# Patient Record
Sex: MALE | Race: WHITE | NOT HISPANIC OR LATINO | Employment: FULL TIME | URBAN - METROPOLITAN AREA
[De-identification: names, ages, dates, MRNs, and addresses within clinical notes are randomized per-mention and may not be internally consistent; named-entity substitution may affect disease eponyms.]

---

## 2017-01-13 ENCOUNTER — ALLSCRIPTS OFFICE VISIT (OUTPATIENT)
Dept: OTHER | Facility: OTHER | Age: 47
End: 2017-01-13

## 2017-01-24 ENCOUNTER — ALLSCRIPTS OFFICE VISIT (OUTPATIENT)
Dept: OTHER | Facility: OTHER | Age: 47
End: 2017-01-24

## 2017-10-09 ENCOUNTER — GENERIC CONVERSION - ENCOUNTER (OUTPATIENT)
Dept: OTHER | Facility: OTHER | Age: 47
End: 2017-10-09

## 2017-10-20 ENCOUNTER — GENERIC CONVERSION - ENCOUNTER (OUTPATIENT)
Dept: OTHER | Facility: OTHER | Age: 47
End: 2017-10-20

## 2017-10-20 LAB
A/G RATIO (HISTORICAL): 2 (ref 1.2–2.2)
ALBUMIN SERPL BCP-MCNC: 4.7 G/DL (ref 3.5–5.5)
ALP SERPL-CCNC: 52 IU/L (ref 39–117)
ALT SERPL W P-5'-P-CCNC: 14 IU/L (ref 0–44)
AST SERPL W P-5'-P-CCNC: 18 IU/L (ref 0–40)
BILIRUB SERPL-MCNC: 0.8 MG/DL (ref 0–1.2)
BUN SERPL-MCNC: 22 MG/DL (ref 6–24)
BUN/CREA RATIO (HISTORICAL): 19 (ref 9–20)
CALCIUM SERPL-MCNC: 9.4 MG/DL (ref 8.7–10.2)
CHLORIDE SERPL-SCNC: 101 MMOL/L (ref 96–106)
CHOLEST SERPL-MCNC: 177 MG/DL (ref 100–199)
CO2 SERPL-SCNC: 24 MMOL/L (ref 18–29)
CREAT SERPL-MCNC: 1.16 MG/DL (ref 0.76–1.27)
EGFR AFRICAN AMERICAN (HISTORICAL): 87 ML/MIN/1.73
EGFR-AMERICAN CALC (HISTORICAL): 75 ML/MIN/1.73
GLUCOSE SERPL-MCNC: 78 MG/DL (ref 65–99)
HDLC SERPL-MCNC: 42 MG/DL
INTERPRETATION (HISTORICAL): NORMAL
LDLC SERPL CALC-MCNC: 120 MG/DL (ref 0–99)
POTASSIUM SERPL-SCNC: 4.4 MMOL/L (ref 3.5–5.2)
PROSTATE SPECIFIC ANTIGEN (HISTORICAL): 0.6 NG/ML (ref 0–4)
SODIUM SERPL-SCNC: 141 MMOL/L (ref 134–144)
TOT. GLOBULIN, SERUM (HISTORICAL): 2.3 G/DL (ref 1.5–4.5)
TOTAL PROTEIN (HISTORICAL): 7 G/DL (ref 6–8.5)
TRIGL SERPL-MCNC: 74 MG/DL (ref 0–149)

## 2017-10-22 ENCOUNTER — GENERIC CONVERSION - ENCOUNTER (OUTPATIENT)
Dept: OTHER | Facility: OTHER | Age: 47
End: 2017-10-22

## 2017-11-06 ENCOUNTER — ALLSCRIPTS OFFICE VISIT (OUTPATIENT)
Dept: OTHER | Facility: OTHER | Age: 47
End: 2017-11-06

## 2018-01-10 NOTE — PROGRESS NOTES
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)   4  10 year risk of MI or stroke < 7 5% (V49 89) (Z91 89)    Discussion/Summary  The patient was counseled regarding risk factor reductions, impressions  Chief Complaint  pt present for CPe, no forms  af/rma      History of Present Illness  HM, Adult Male: The patient is being seen for a health maintenance evaluation  General Health: The patient's health since the last visit is described as good  Lifestyle:  He consumes a diverse and healthy diet  He does not have any weight concerns  He exercises regularly  He does not use tobacco    Screening:      Review of Systems    Cardiovascular: no chest pain  Respiratory: no shortness of breath  Genitourinary: urologist in past for prostatitis, had pelvic floor PT with some benefit  Musculoskeletal: no arthralgias  Active Problems    1  Actinic keratosis (702 0) (L57 0)   2  Allergic rhinitis (477 9) (J30 9)   3  BMI 27 0-27 9,adult (V85 23) (Z68 27)   4  Central serous retinopathy, 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)   5  Colon cancer screening (V76 51) (Z12 11)   6  Depression screening (V79 0) (Z13 89)   7  External hemorrhoids (455 3) (K64 4)   8  Immunization due (V05 9) (Z23)   9  Migraine headache (346 90) (G43 909)   10  Colon's neuroma of right foot (355 6) (G57 61)   11  Overweight (278 02) (E66 3)   12  Prostate cancer screening (V76 44) (Z12 5)   13  Routine eye exam (V72 0) (Z01 00)   14  Screening for cardiovascular, respiratory, and genitourinary diseases    (V81 2,V81 4,V81 6) (Z13 6,Z13 83,Z13 89)   15  Screening for diabetes mellitus (DM) (V77 1) (Z13 1)   16   Well adult on routine health check (V70 0) (Z00 00)    Past Medical History    · History of Blister of right foot, initial encounter (917 2) (F74 490Z)   · History of Bunion (727 1) (M21 619)   · History of Closed Fracture Of The Metatarsal Bone(S) (825 25)   · History of Dyspepsia (536 8) (K30)   · History of acute sinusitis (V12 69) (Z87 09)   · History of chalazion (V12 49) (Z86 69)   · History of dermatitis (V13 3) (Z87 2)   · History of herpes zoster (V12 09) (Z86 19)   · History of tinea corporis (V12 09) (Z86 19)   · History of urticaria (V13 3) (Z87 2)   · History of Palpitations (785 1) (R00 2)   · History of Pes planus, congenital (754 61) (Q66 50)   · History of Plantar fibromatosis (728 71) (M72 2)   · History of Pruritus ani (698 0) (L29 0)   · History of Right foot pain (729 5) (M79 671)   · History of Skin neoplasm (239 2) (D49 2)   · History of Verruca plana (078 19) (B07 8)   · Well adult on routine health check (V70 0) (Z00 00)    Family History  Mother    · Family history of Diabetes mellitus   · Family history of Gout   · Family history of Hypertension  Father    · Family history of Lung cancer    Social History    · Never A Smoker    Current Meds   1  Multi-Vitamin TABS; Therapy: (Recorded:97Gnd2029) to Recorded    Allergies    1  Cefadroxil CAPS   2  Bactrim TABS   3  Cortizone-5   4  Omnicef CAPS   5  Penicillins    Vitals   Recorded: 49YRR0063 01:10PM   Temperature 98 F   Heart Rate 68   Respiration 16   Systolic 195   Diastolic 76   Height 5 ft 7 in   Weight 177 lb    BMI Calculated 27 72   BSA Calculated 1 92     Physical Exam    Constitutional   General appearance: No acute distress, well appearing and well nourished  Eyes   Conjunctiva and lids: No erythema, swelling or discharge  Pupils and irises: Equal, round, reactive to light  Ears, Nose, Mouth, and Throat   External inspection of ears and nose: Normal     Otoscopic examination: Tympanic membranes translucent with normal light reflex  Canals patent without erythema  Nasal mucosa, septum, and turbinates: Normal without edema or erythema  Lips, teeth, and gums: Normal, good dentition      Oropharynx: Normal with no erythema, edema, exudate or lesions  Neck   Neck: Supple, symmetric, trachea midline, no masses  Pulmonary   Respiratory effort: No increased work of breathing or signs of respiratory distress  Auscultation of lungs: Clear to auscultation  Cardiovascular   Palpation of heart: Normal PMI, no thrills  Auscultation of heart: Normal rate and rhythm, normal S1 and S2, no murmurs  Femoral pulses: 2+ bilaterally  Pedal pulses: 2+ bilaterally  Examination of extremities for edema and/or varicosities: Normal     Abdomen   Abdomen: Non-tender, no masses  Liver and spleen: No hepatomegaly or splenomegaly  Examination for hernias: No hernias appreciated  Genitourinary   Scrotal contents: Normal testes, no masses  Penis: Normal, no lesions  Digital rectal exam of prostate: Normal size, no masses  Lymphatic   Palpation of lymph nodes in neck: No lymphadenopathy  Palpation of lymph nodes in groin: No lymphadenopathy  Musculoskeletal   Gait and station: Normal     Inspection/palpation of digits and nails: Normal without clubbing or cyanosis  Inspection/palpation of joints, bones, and muscles: Normal     Skin   Skin and subcutaneous tissue: Normal without rashes or lesions  Palpation of skin and subcutaneous tissue: Normal turgor  Neurologic   Reflexes: 2+ and symmetric  Sensation: No sensory loss  Psychiatric   Judgment and insight: Normal     Mood and affect: Normal        Results/Data  [place] Risk for General CVD [id number] 01:20PM Jewel Rice     Test Name Result Flag Reference   [place] CVD - Heart Age 53 Years     [place] CVD - Normal 6 %     [place] CVD - Ten Year 6 5 %     Sex: Male  Age: 56  Total Cholesterol: 177 mg/dL  HDL Cholesterol: 42 mg/dL  Systolic Blood Pressure: 213 mmHg  Diabetes: No  Smoking Status: No  Being treated for hypertension: No       Procedure    Procedure: Visual Acuity Test    Inforrmation supplied by a Snellen chart     Results: 20/15 in both eyes without corrective device, 20/15 in the right eye without corrective device, 20/20 in the left eye without corrective device      Signatures   Electronically signed by : Deidra Estrada DO; Nov 6 2017  1:34PM EST                       (Author)

## 2018-01-12 VITALS
SYSTOLIC BLOOD PRESSURE: 128 MMHG | DIASTOLIC BLOOD PRESSURE: 76 MMHG | HEART RATE: 68 BPM | HEIGHT: 67 IN | RESPIRATION RATE: 16 BRPM | BODY MASS INDEX: 27.78 KG/M2 | WEIGHT: 177 LBS | TEMPERATURE: 98 F

## 2018-01-13 VITALS
SYSTOLIC BLOOD PRESSURE: 120 MMHG | DIASTOLIC BLOOD PRESSURE: 70 MMHG | WEIGHT: 178 LBS | HEIGHT: 67 IN | BODY MASS INDEX: 27.94 KG/M2

## 2018-01-15 VITALS
BODY MASS INDEX: 27.94 KG/M2 | WEIGHT: 178 LBS | DIASTOLIC BLOOD PRESSURE: 88 MMHG | SYSTOLIC BLOOD PRESSURE: 130 MMHG | HEIGHT: 67 IN

## 2018-01-15 NOTE — PROGRESS NOTES
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)   4  Overweight (278 02) (E66 3)    Plan  Health Maintenance    · Avoid sun exposure ; Status:Complete;   Done: 44MMN2983   · Begin a limited exercise program ; Status:Complete;   Done: 38JCP8930   · Drink plenty of fluids ; Status:Complete;   Done: 89TYK8450   · Eat a low fat and low cholesterol diet ; Status:Complete;   Done: 59GAF9933   · Eat foods that are high in calcium ; Status:Complete;   Done: 72HWK7129   · Shared Decision Making Aid given; Status:Complete;   Done: 47ITP5898    Chief Complaint  pt present for a CPE  ac student cma      History of Present Illness  HM, Adult Male: The patient is being seen for a health maintenance evaluation  General Health: The patient's health since the last visit is described as good  Immunizations status: up to date  Lifestyle:  He does not have a healthy diet  He exercises regularly  He does not use tobacco  He denies alcohol use  likes to run  Screening:   HPI: gets headaches after running but not as much when he drinks adequate water, has been taking electrolyte supplement also, labs normal      Review of Systems    Constitutional: no fever and no chills  Cardiovascular: no chest pain  Respiratory: no shortness of breath  Neurological: no dizziness and no fainting  Active Problems    1  Actinic keratosis (702 0) (L57 0)   2  Allergic rhinitis (477 9) (J30 9)   3  BMI 27 0-27 9,adult (V85 23) (Z68 27)   4  Central serous retinopathy, 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)   5  Colon cancer screening (V76 51) (Z12 11)   6  Depression screening (V79 0) (Z13 89)   7  External Hemorrhoids (455 3)   8  Immunization due (V05 9) (Z23)   9  Migraine headache (346 90) (G43 909)   10  Overweight (278 02) (E66 3)   11  Pes planus, congenital (754 61) (Q66 50)   12   Prostate cancer screening (V76 44) (Z12 5)   13  Screening for diabetes mellitus (DM) (V77 1) (Z13 1)   14  Screening for lipid disorders (V77 91) (Z13 220)   15  Well adult on routine health check (V70 0) (Z00 00)    Past Medical History    · History of Bunion (727 1) (M20 10)   · History of Closed Fracture Of The Metatarsal Bone(S) (825 25)   · History of Dyspepsia (536 8) (K30)   · History of acute sinusitis (V12 69) (Z87 09)   · History of chalazion (V12 49) (Z86 69)   · History of dermatitis (V13 3) (Z87 2)   · History of herpes zoster (V12 09) (Z86 19)   · History of tinea corporis (V12 09) (Z86 19)   · History of urticaria (V13 3) (Z87 2)   · History of Palpitations (785 1) (R00 2)   · History of Plantar fibromatosis (728 71) (M72 2)   · History of Pruritus ani (698 0) (L29 0)   · History of Skin neoplasm (239 2) (D49 2)   · Well adult on routine health check (V70 0) (Z00 00)    Family History    · Family history of Diabetes mellitus   · Family history of Gout   · Family history of Hypertension    · Family history of Lung cancer    Social History    · Never A Smoker    Current Meds   1  Multi-Vitamin TABS; Therapy: (Recorded:64Rmo1891) to Recorded    Allergies    1  Cefadroxil CAPS   2  Bactrim TABS   3  Cortizone-5   4  Omnicef CAPS   5  Penicillins    Vitals   Recorded: 58Vtj6893 03:26PM   Temperature 98 3 F   Heart Rate 90   Respiration 20   Systolic 784   Diastolic 70   Height 5 ft 7 in   Weight 178 lb    BMI Calculated 27 88   BSA Calculated 1 92     Physical Exam    Constitutional   General appearance: No acute distress, well appearing and well nourished  Eyes   Conjunctiva and lids: No erythema, swelling or discharge  Pupils and irises: Equal, round, reactive to light  Ophthalmoscopic examination: Normal fundi and optic discs  Ears, Nose, Mouth, and Throat   External inspection of ears and nose: Normal     Otoscopic examination: Tympanic membranes translucent with normal light reflex  Canals patent without erythema  Nasal mucosa, septum, and turbinates: Normal without edema or erythema  Lips, teeth, and gums: Normal, good dentition  Oropharynx: Normal with no erythema, edema, exudate or lesions  Neck   Neck: Supple, symmetric, trachea midline, no masses  Thyroid: Normal, no thyromegaly  Pulmonary   Respiratory effort: No increased work of breathing or signs of respiratory distress  Auscultation of lungs: Clear to auscultation  Cardiovascular   Auscultation of heart: Normal rate and rhythm, normal S1 and S2, no murmurs  Carotid pulses: 2+ bilaterally  Abdominal aorta: Normal     Examination of extremities for edema and/or varicosities: Normal     Chest   Breasts: Normal, no dimpling or skin changes appreciated  Abdomen   Abdomen: Non-tender, no masses  Liver and spleen: No hepatomegaly or splenomegaly  Examination for hernias: No hernias appreciated  Anus, perineum, and rectum: Normal sphincter tone, no masses, no prolapse  Genitourinary   Scrotal contents: Normal testes, no masses  Penis: Normal, no lesions  Digital rectal exam of prostate: Normal size, no masses  Lymphatic   Palpation of lymph nodes in neck: No lymphadenopathy  Palpation of lymph nodes in groin: No lymphadenopathy  Musculoskeletal   Gait and station: Normal     Inspection/palpation of digits and nails: Normal without clubbing or cyanosis  Inspection/palpation of joints, bones, and muscles: Normal     Range of motion: Normal     Stability: Normal     Skin   Skin and subcutaneous tissue: Normal without rashes or lesions  Palpation of skin and subcutaneous tissue: Normal turgor  Neurologic   Reflexes: 2+ and symmetric  Sensation: No sensory loss      Psychiatric   Judgment and insight: Normal     Mood and affect: Normal        Results/Data  DIGITAL RECTAL EXAM 20Jan2015 08:05PM Elex Mooring     Test Name Result Flag Reference   DIGITAL RECTAL EXAM [id number]         Procedure    Procedure: Visual Acuity Test    Indication: routine screening     Results: 20/15 in both eyes without corrective device, 20/15 in the right eye without corrective device, 20/20 in the left eye without corrective device   Color vision was and the results were normal       Signatures   Electronically signed by : Jennifer Treadwell DO; Feb 26 2016 10:33PM EST                       (Author)

## 2018-01-15 NOTE — RESULT NOTES
Verified Results  (1) COMPREHENSIVE METABOLIC PANEL [id number] 64:20EW Luis Mckeon     Test Name Result Flag Reference   Glucose, Serum 78 mg/dL  65-99   BUN 22 mg/dL  6-24   Creatinine, Serum 1 16 mg/dL  0 76-1 27   BUN/Creatinine Ratio 19  9-20   Sodium, Serum 141 mmol/L  134-144   Potassium, Serum 4 4 mmol/L  3 5-5 2   Chloride, Serum 101 mmol/L     Carbon Dioxide, Total 24 mmol/L  18-29   Calcium, Serum 9 4 mg/dL  8 7-10 2   Protein, Total, Serum 7 0 g/dL  6 0-8 5   Albumin, Serum 4 7 g/dL  3 5-5 5   Globulin, Total 2 3 g/dL  1 5-4 5   A/G Ratio 2 0  1 2-2 2   Bilirubin, Total 0 8 mg/dL  0 0-1 2   Alkaline Phosphatase, S 52 IU/L     AST (SGOT) 18 IU/L  0-40   ALT (SGPT) 14 IU/L  0-44   eGFR If NonAfricn Am 75 mL/min/1 73  >59   eGFR If Africn Am 87 mL/min/1 73  >59

## 2018-01-18 NOTE — RESULT NOTES
Verified Results  (1) PSA (SCREEN) (Dx V76 44 Screen for Prostate Cancer) [id number] 09:37AM Maurilio Dodson     Test Name Result Flag Reference   Prostate Specific Ag, Serum 0 6 ng/mL  0 0-4 0   Roche ECLIA methodology  According to the American Urological Association, Serum PSA should  decrease and remain at undetectable levels after radical  prostatectomy  The AUA defines biochemical recurrence as an initial  PSA value 0 2 ng/mL or greater followed by a subsequent confirmatory  PSA value 0 2 ng/mL or greater  Values obtained with different assay methods or kits cannot be used  interchangeably  Results cannot be interpreted as absolute evidence  of the presence or absence of malignant disease

## 2018-11-20 VITALS
HEART RATE: 71 BPM | SYSTOLIC BLOOD PRESSURE: 121 MMHG | BODY MASS INDEX: 27.62 KG/M2 | DIASTOLIC BLOOD PRESSURE: 84 MMHG | WEIGHT: 176 LBS | HEIGHT: 67 IN

## 2018-11-20 DIAGNOSIS — M54.50 ACUTE MIDLINE LOW BACK PAIN WITHOUT SCIATICA: Primary | ICD-10-CM

## 2018-11-20 PROCEDURE — 99203 OFFICE O/P NEW LOW 30 MIN: CPT | Performed by: ORTHOPAEDIC SURGERY

## 2018-11-20 RX ORDER — MULTIVITAMIN
TABLET ORAL
COMMUNITY

## 2018-11-20 NOTE — PROGRESS NOTES
Assessment/Plan:  1  Acute midline low back pain without sciatica         Mary Rose has low back discomfort and the sensation of popping is back which does not sound concerning  He is likely just recreating an audible pop at the facet joint which is what chiropractors use to improve range of motion  I informed him that he should refrain from continuously doing this but this does provide some relief with his discomfort at time and it is certainly okay  We could have him start physical therapy for to improve his hamstring flexibility  He does not have any concerning signs of radiculopathy on exam today  If he has continued discomfort we could consider x-ray imaging if needed  He can follow up with me as needed  Subjective: Mery Archibald is a 53 y o  male who presents for evaluation for low back discomfort  He denies any specific injury or trauma  He states over the last few months he has noticed increased necessity to crack his back  He denies falling on his back but does state that when he runs several days awake he feels a lot of tightness in his low back and is hamstring  He can twist his back in a maneuver and get an audible pop which improved his range of motion and eliminates the discomfort  He saw a chiropractor who given adjustment which did help him  He denies any radicular pain or numbness in his legs whatsoever  Review of Systems   Constitutional: Negative for chills, fever and unexpected weight change  HENT: Negative for hearing loss, nosebleeds and sore throat  Eyes: Negative for pain, redness and visual disturbance  Respiratory: Negative for cough, shortness of breath and wheezing  Cardiovascular: Negative for chest pain, palpitations and leg swelling  Gastrointestinal: Negative for abdominal pain, nausea and vomiting  Endocrine: Negative for polyphagia and polyuria  Genitourinary: Negative for dysuria and hematuria     Musculoskeletal:        See HPI   Skin: Negative for rash and wound  Neurological: Negative for dizziness, numbness and headaches  Psychiatric/Behavioral: Negative for decreased concentration and suicidal ideas  The patient is not nervous/anxious  History reviewed  No pertinent past medical history  History reviewed  No pertinent surgical history  History reviewed  No pertinent family history  Social History     Occupational History    Not on file  Social History Main Topics    Smoking status: Never Smoker    Smokeless tobacco: Never Used    Alcohol use Yes      Comment: social    Drug use: No    Sexual activity: Not on file         Current Outpatient Prescriptions:     Multiple Vitamin (MULTI VITAMIN DAILY) TABS, Take by mouth, Disp: , Rfl:     Allergies   Allergen Reactions    Cefadroxil Hives    Cefdinir     Cortizone-10  [Hydrocortisone]     Penicillins      Reaction Date: [id number];     Sulfamethoxazole-Trimethoprim Rash       Objective:  Vitals:    11/20/18 1408   BP: 121/84   Pulse: 71       Back Exam     Tenderness   The patient is experiencing no tenderness  Range of Motion   The patient has normal back ROM  Muscle Strength   The patient has normal back strength  Tests   Straight leg raise right: negative  Straight leg raise left: negative    Reflexes   Patellar: normal    Other   Toe Walk: normal  Heel Walk: normal  Sensation: normal  Gait: normal   Erythema: no back redness            Physical Exam   Constitutional: He is oriented to person, place, and time  He appears well-developed  HENT:   Head: Normocephalic and atraumatic  Eyes: Conjunctivae are normal    Neck: Neck supple  Cardiovascular: Intact distal pulses  Pulmonary/Chest: Effort normal    Neurological: He is alert and oriented to person, place, and time  Skin: Skin is warm and dry  Psychiatric: He has a normal mood and affect  His behavior is normal    Vitals reviewed

## 2019-05-24 ENCOUNTER — TELEPHONE (OUTPATIENT)
Dept: FAMILY MEDICINE CLINIC | Facility: CLINIC | Age: 49
End: 2019-05-24

## 2019-05-24 ENCOUNTER — OFFICE VISIT (OUTPATIENT)
Dept: PODIATRY | Facility: CLINIC | Age: 49
End: 2019-05-24
Payer: COMMERCIAL

## 2019-05-24 VITALS
DIASTOLIC BLOOD PRESSURE: 84 MMHG | WEIGHT: 176 LBS | SYSTOLIC BLOOD PRESSURE: 125 MMHG | HEIGHT: 67 IN | BODY MASS INDEX: 27.62 KG/M2

## 2019-05-24 DIAGNOSIS — Q66.51 CONGENITAL PES PLANUS OF RIGHT FOOT: ICD-10-CM

## 2019-05-24 DIAGNOSIS — Q66.52 CONGENITAL PES PLANUS OF LEFT FOOT: ICD-10-CM

## 2019-05-24 DIAGNOSIS — M72.2 PLANTAR FASCIITIS: Primary | ICD-10-CM

## 2019-05-24 DIAGNOSIS — G57.61 MORTON'S NEUROMA OF SECOND INTERSPACE OF RIGHT FOOT: ICD-10-CM

## 2019-05-24 PROCEDURE — L3000 FT INSERT UCB BERKELEY SHELL: HCPCS | Performed by: PODIATRIST

## 2019-05-24 PROCEDURE — 99213 OFFICE O/P EST LOW 20 MIN: CPT | Performed by: PODIATRIST

## 2019-05-27 DIAGNOSIS — Z13.89 SCREENING FOR CARDIOVASCULAR, RESPIRATORY, AND GENITOURINARY DISEASES: Primary | ICD-10-CM

## 2019-05-27 DIAGNOSIS — Z13.83 SCREENING FOR CARDIOVASCULAR, RESPIRATORY, AND GENITOURINARY DISEASES: Primary | ICD-10-CM

## 2019-05-27 DIAGNOSIS — Z13.1 SCREENING FOR DIABETES MELLITUS (DM): ICD-10-CM

## 2019-05-27 DIAGNOSIS — Z13.6 SCREENING FOR CARDIOVASCULAR, RESPIRATORY, AND GENITOURINARY DISEASES: Primary | ICD-10-CM

## 2019-05-27 DIAGNOSIS — Z12.5 PROSTATE CANCER SCREENING: ICD-10-CM

## 2019-06-02 PROBLEM — K64.4 EXTERNAL HEMORRHOIDS: Status: ACTIVE | Noted: 2017-10-09

## 2019-06-02 PROBLEM — G57.61 MORTON'S NEUROMA OF RIGHT FOOT: Status: ACTIVE | Noted: 2017-01-13

## 2019-06-04 LAB
ALBUMIN SERPL-MCNC: 4.7 G/DL (ref 3.5–5.5)
ALBUMIN/GLOB SERPL: 2.1 {RATIO} (ref 1.2–2.2)
ALP SERPL-CCNC: 53 IU/L (ref 39–117)
ALT SERPL-CCNC: 14 IU/L (ref 0–44)
AST SERPL-CCNC: 19 IU/L (ref 0–40)
BILIRUB SERPL-MCNC: 0.7 MG/DL (ref 0–1.2)
BUN SERPL-MCNC: 16 MG/DL (ref 6–24)
BUN/CREAT SERPL: 15 (ref 9–20)
CALCIUM SERPL-MCNC: 9.9 MG/DL (ref 8.7–10.2)
CHLORIDE SERPL-SCNC: 99 MMOL/L (ref 96–106)
CHOLEST SERPL-MCNC: 179 MG/DL (ref 100–199)
CO2 SERPL-SCNC: 27 MMOL/L (ref 20–29)
CREAT SERPL-MCNC: 1.08 MG/DL (ref 0.76–1.27)
GLOBULIN SER-MCNC: 2.2 G/DL (ref 1.5–4.5)
GLUCOSE SERPL-MCNC: 88 MG/DL (ref 65–99)
HDLC SERPL-MCNC: 43 MG/DL
LABCORP COMMENT: NORMAL
LDLC SERPL CALC-MCNC: 119 MG/DL (ref 0–99)
MICRODELETION SYND BLD/T FISH: NORMAL
POTASSIUM SERPL-SCNC: 4.1 MMOL/L (ref 3.5–5.2)
PROT SERPL-MCNC: 6.9 G/DL (ref 6–8.5)
PSA SERPL-MCNC: 0.6 NG/ML (ref 0–4)
SL AMB EGFR AFRICAN AMERICAN: 93 ML/MIN/1.73
SL AMB EGFR NON AFRICAN AMERICAN: 81 ML/MIN/1.73
SODIUM SERPL-SCNC: 140 MMOL/L (ref 134–144)
TRIGL SERPL-MCNC: 86 MG/DL (ref 0–149)

## 2019-06-07 ENCOUNTER — OFFICE VISIT (OUTPATIENT)
Dept: FAMILY MEDICINE CLINIC | Facility: CLINIC | Age: 49
End: 2019-06-07
Payer: COMMERCIAL

## 2019-06-07 VITALS
TEMPERATURE: 98.5 F | DIASTOLIC BLOOD PRESSURE: 66 MMHG | RESPIRATION RATE: 16 BRPM | WEIGHT: 177.6 LBS | HEART RATE: 64 BPM | BODY MASS INDEX: 27.88 KG/M2 | HEIGHT: 67 IN | SYSTOLIC BLOOD PRESSURE: 110 MMHG

## 2019-06-07 DIAGNOSIS — Z00.00 HEALTHCARE MAINTENANCE: Primary | ICD-10-CM

## 2019-06-07 DIAGNOSIS — Z91.89 FRAMINGHAM CARDIAC RISK <10% IN NEXT 10 YEARS: ICD-10-CM

## 2019-06-07 DIAGNOSIS — H35.712 CENTRAL SEROUS RETINOPATHY, LEFT: ICD-10-CM

## 2019-06-07 DIAGNOSIS — B35.6 TINEA CRURIS: ICD-10-CM

## 2019-06-07 PROCEDURE — 99396 PREV VISIT EST AGE 40-64: CPT | Performed by: FAMILY MEDICINE

## 2019-06-07 RX ORDER — NYSTATIN 100000 U/G
CREAM TOPICAL 2 TIMES DAILY
Qty: 30 G | Refills: 5 | Status: SHIPPED | OUTPATIENT
Start: 2019-06-07 | End: 2020-09-09

## 2020-05-12 ENCOUNTER — TELEMEDICINE (OUTPATIENT)
Dept: FAMILY MEDICINE CLINIC | Facility: CLINIC | Age: 50
End: 2020-05-12
Payer: COMMERCIAL

## 2020-05-12 DIAGNOSIS — K29.00 ACUTE GASTRITIS WITHOUT HEMORRHAGE, UNSPECIFIED GASTRITIS TYPE: Primary | ICD-10-CM

## 2020-05-12 DIAGNOSIS — N52.9 ERECTILE DYSFUNCTION, UNSPECIFIED ERECTILE DYSFUNCTION TYPE: ICD-10-CM

## 2020-05-12 DIAGNOSIS — H35.712 CENTRAL SEROUS RETINOPATHY, LEFT: ICD-10-CM

## 2020-05-12 PROCEDURE — 99214 OFFICE O/P EST MOD 30 MIN: CPT | Performed by: FAMILY MEDICINE

## 2020-05-12 RX ORDER — TADALAFIL 20 MG/1
TABLET ORAL
COMMUNITY
Start: 2020-03-20 | End: 2020-05-12

## 2020-05-12 RX ORDER — ONDANSETRON 8 MG/1
8 TABLET, ORALLY DISINTEGRATING ORAL EVERY 8 HOURS PRN
Qty: 30 TABLET | Refills: 0 | Status: SHIPPED | OUTPATIENT
Start: 2020-05-12 | End: 2020-09-09

## 2020-05-12 RX ORDER — SILDENAFIL 100 MG/1
100 TABLET, FILM COATED ORAL DAILY PRN
Qty: 30 TABLET | Refills: 5 | Status: SHIPPED | OUTPATIENT
Start: 2020-05-12 | End: 2021-05-13 | Stop reason: SDUPTHER

## 2020-05-12 RX ORDER — SUCRALFATE ORAL 1 G/10ML
1 SUSPENSION ORAL 4 TIMES DAILY
Qty: 420 ML | Refills: 0 | Status: SHIPPED | OUTPATIENT
Start: 2020-05-12 | End: 2020-09-07

## 2020-08-14 ENCOUNTER — TELEPHONE (OUTPATIENT)
Dept: FAMILY MEDICINE CLINIC | Facility: CLINIC | Age: 50
End: 2020-08-14

## 2020-08-14 DIAGNOSIS — Z13.1 SCREENING FOR DIABETES MELLITUS (DM): ICD-10-CM

## 2020-08-14 DIAGNOSIS — N52.9 ERECTILE DYSFUNCTION, UNSPECIFIED ERECTILE DYSFUNCTION TYPE: ICD-10-CM

## 2020-08-14 DIAGNOSIS — Z13.89 SCREENING FOR CARDIOVASCULAR, RESPIRATORY, AND GENITOURINARY DISEASES: Primary | ICD-10-CM

## 2020-08-14 DIAGNOSIS — Z13.83 SCREENING FOR CARDIOVASCULAR, RESPIRATORY, AND GENITOURINARY DISEASES: Primary | ICD-10-CM

## 2020-08-14 DIAGNOSIS — Z13.6 SCREENING FOR CARDIOVASCULAR, RESPIRATORY, AND GENITOURINARY DISEASES: Primary | ICD-10-CM

## 2020-08-14 DIAGNOSIS — Z12.5 PROSTATE CANCER SCREENING: ICD-10-CM

## 2020-08-14 NOTE — TELEPHONE ENCOUNTER
Patient called scheduled his CPE appt for 9/9/2020 with Dr Romero Koyanagi  He would like to go for his routine blood work prior to his visit  He also asked that Dr Romero Koyanagi add a morning testosterone test with that order  His Urologist Dr Watson Zarco ([facility]), advised him to ask his PCP to order this      Please call patient when order is ready for

## 2020-09-02 LAB
ALBUMIN SERPL-MCNC: 4.8 G/DL (ref 4–5)
ALBUMIN/GLOB SERPL: 2.5 {RATIO} (ref 1.2–2.2)
ALP SERPL-CCNC: 53 IU/L (ref 39–117)
ALT SERPL-CCNC: 16 IU/L (ref 0–44)
AST SERPL-CCNC: 19 IU/L (ref 0–40)
BILIRUB SERPL-MCNC: 0.6 MG/DL (ref 0–1.2)
BUN SERPL-MCNC: 19 MG/DL (ref 6–24)
BUN/CREAT SERPL: 18 (ref 9–20)
CALCIUM SERPL-MCNC: 9.4 MG/DL (ref 8.7–10.2)
CHLORIDE SERPL-SCNC: 103 MMOL/L (ref 96–106)
CHOLEST SERPL-MCNC: 167 MG/DL (ref 100–199)
CO2 SERPL-SCNC: 23 MMOL/L (ref 20–29)
CREAT SERPL-MCNC: 1.08 MG/DL (ref 0.76–1.27)
GLOBULIN SER-MCNC: 1.9 G/DL (ref 1.5–4.5)
GLUCOSE SERPL-MCNC: 85 MG/DL (ref 65–99)
HDLC SERPL-MCNC: 40 MG/DL
LDLC SERPL CALC-MCNC: 108 MG/DL (ref 0–99)
MICRODELETION SYND BLD/T FISH: NORMAL
POTASSIUM SERPL-SCNC: 4.2 MMOL/L (ref 3.5–5.2)
PROT SERPL-MCNC: 6.7 G/DL (ref 6–8.5)
PSA SERPL-MCNC: 0.6 NG/ML (ref 0–4)
SL AMB EGFR AFRICAN AMERICAN: 93 ML/MIN/1.73
SL AMB EGFR NON AFRICAN AMERICAN: 80 ML/MIN/1.73
SODIUM SERPL-SCNC: 140 MMOL/L (ref 134–144)
TESTOST SERPL-MCNC: 810 NG/DL (ref 264–916)
TRIGL SERPL-MCNC: 105 MG/DL (ref 0–149)

## 2020-09-07 PROBLEM — K29.00 ACUTE GASTRITIS WITHOUT HEMORRHAGE: Status: RESOLVED | Noted: 2020-05-12 | Resolved: 2020-09-07

## 2020-09-09 ENCOUNTER — OFFICE VISIT (OUTPATIENT)
Dept: FAMILY MEDICINE CLINIC | Facility: CLINIC | Age: 50
End: 2020-09-09
Payer: COMMERCIAL

## 2020-09-09 VITALS
BODY MASS INDEX: 26.68 KG/M2 | TEMPERATURE: 97.2 F | HEART RATE: 70 BPM | DIASTOLIC BLOOD PRESSURE: 70 MMHG | RESPIRATION RATE: 16 BRPM | SYSTOLIC BLOOD PRESSURE: 118 MMHG | WEIGHT: 170 LBS | HEIGHT: 67 IN | OXYGEN SATURATION: 98 %

## 2020-09-09 DIAGNOSIS — K29.50 CHRONIC GASTRITIS WITHOUT BLEEDING, UNSPECIFIED GASTRITIS TYPE: ICD-10-CM

## 2020-09-09 DIAGNOSIS — Z23 NEED FOR VACCINATION: ICD-10-CM

## 2020-09-09 DIAGNOSIS — Z00.00 HEALTHCARE MAINTENANCE: Primary | ICD-10-CM

## 2020-09-09 DIAGNOSIS — Z23 IMMUNIZATION DUE: ICD-10-CM

## 2020-09-09 PROCEDURE — 90471 IMMUNIZATION ADMIN: CPT

## 2020-09-09 PROCEDURE — 1036F TOBACCO NON-USER: CPT | Performed by: FAMILY MEDICINE

## 2020-09-09 PROCEDURE — 3725F SCREEN DEPRESSION PERFORMED: CPT | Performed by: FAMILY MEDICINE

## 2020-09-09 PROCEDURE — 99396 PREV VISIT EST AGE 40-64: CPT | Performed by: FAMILY MEDICINE

## 2020-09-09 PROCEDURE — 90686 IIV4 VACC NO PRSV 0.5 ML IM: CPT

## 2020-09-09 RX ORDER — CHLORAL HYDRATE 500 MG
1000 CAPSULE ORAL DAILY
COMMUNITY
End: 2021-09-27 | Stop reason: ALTCHOICE

## 2020-09-09 RX ORDER — OMEGA-3S/DHA/EPA/FISH OIL/D3 300MG-1000
400 CAPSULE ORAL DAILY
COMMUNITY

## 2020-09-09 NOTE — PROGRESS NOTES
Assessment/Plan:    No problem-specific Assessment & Plan notes found for this encounter  cpe  Gi f/u with Dr Tato Francois for ongoing gastritis symptoms  Suggest colonoscopy at age 49  Labs reviewed     Diagnoses and all orders for this visit:    Healthcare maintenance    Need for vaccination  -     Cancel: influenza vaccine, quadrivalent, recombinant, PF, 0 5 mL, for patients 18 yr+ (FLUBLOK)    Immunization due  -     influenza vaccine, quadrivalent, 0 5 mL, preservative-free, for adult and pediatric patients 6 mos+ (AFLURIA, FLUARIX, FLULAVAL, FLUZONE)    Chronic gastritis without bleeding, unspecified gastritis type  -     Ambulatory referral to Gastroenterology; Future    Other orders  -     cholecalciferol (VITAMIN D3) 400 units tablet; Take 400 Units by mouth daily  -     Omega-3 Fatty Acids (fish oil) 1,000 mg; Take 1,000 mg by mouth daily        Return if symptoms worsen or fail to improve  Subjective:      Patient ID: Maggie Quintanilla is a 53 y o  male  Chief Complaint   Patient presents with    Physical Exam     jlopezcma        HPI  Hx of   Sees Dr Matthew Wall locally, retinology Dr Aaron Spaulding well  Lost 8# with gastritis issue    carafate helped at the time  Better with time and food  No specific food trigger  No coffee  Minimal etoh  EGD in past, 6y ago, Dr Tato Francois  Some acid issues    Bike and run and lift    viagra caused gi upset  Hx of prostatitis    uro Dr David Lozano for prostatis    The following portions of the patient's history were reviewed and updated as appropriate: allergies, current medications, past family history, past medical history, past social history, past surgical history and problem list     Review of Systems   Respiratory: Negative for shortness of breath  Cardiovascular: Negative for chest pain           Current Outpatient Medications   Medication Sig Dispense Refill    cholecalciferol (VITAMIN D3) 400 units tablet Take 400 Units by mouth daily      Multiple Vitamin (MULTI VITAMIN DAILY) TABS Take by mouth      Omega-3 Fatty Acids (fish oil) 1,000 mg Take 1,000 mg by mouth daily      sildenafil (VIAGRA) 100 mg tablet Take 1 tablet (100 mg total) by mouth daily as needed for erectile dysfunction 30 tablet 5     No current facility-administered medications for this visit  Objective:    /70   Pulse 70   Temp (!) 97 2 °F (36 2 °C)   Resp 16   Ht 5' 7" (1 702 m)   Wt 77 1 kg (170 lb)   SpO2 98%   BMI 26 63 kg/m²        Physical Exam  Vitals signs and nursing note reviewed  Constitutional:       Appearance: Normal appearance  He is well-developed  He is not ill-appearing  HENT:      Head: Normocephalic  Right Ear: Tympanic membrane and external ear normal       Left Ear: Tympanic membrane and external ear normal       Nose: No rhinorrhea  Mouth/Throat:      Pharynx: No posterior oropharyngeal erythema  Eyes:      General: No scleral icterus  Conjunctiva/sclera: Conjunctivae normal    Neck:      Musculoskeletal: Neck supple  No neck rigidity  Thyroid: No thyromegaly  Cardiovascular:      Rate and Rhythm: Normal rate and regular rhythm  Heart sounds: No murmur  Pulmonary:      Effort: Pulmonary effort is normal  No respiratory distress  Breath sounds: No wheezing  Abdominal:      General: Bowel sounds are normal  There is no distension  Palpations: Abdomen is soft  There is no mass  Hernia: No hernia is present  Genitourinary:     Penis: Normal        Scrotum/Testes: Normal       Prostate: Normal       Rectum: Normal    Musculoskeletal:         General: No deformity  Lymphadenopathy:      Cervical: No cervical adenopathy  Skin:     General: Skin is warm and dry  Coloration: Skin is not jaundiced or pale  Neurological:      Mental Status: He is alert  Motor: No weakness        Gait: Gait normal    Psychiatric:         Mood and Affect: Mood normal          Behavior: Behavior normal  Thought Content: Thought content normal          BMI Counseling: Body mass index is 26 63 kg/m²  The BMI is above normal  Nutrition recommendations include decreasing portion sizes and moderation in carbohydrate intake  Exercise recommendations include exercising 3-5 times per week  No pharmacotherapy was ordered                Micki Sosa DO

## 2020-10-13 DIAGNOSIS — Z83.79 FAMILY HISTORY OF CELIAC DISEASE: Primary | ICD-10-CM

## 2020-11-28 LAB
ENDOMYSIUM IGA SER QL: NEGATIVE
GLIADIN PEPTIDE IGA SER-ACNC: 3 UNITS (ref 0–19)
GLIADIN PEPTIDE IGG SER-ACNC: 5 UNITS (ref 0–19)
IGA SERPL-MCNC: 122 MG/DL (ref 90–386)
TTG IGA SER-ACNC: <2 U/ML (ref 0–3)
TTG IGG SER-ACNC: 6 U/ML (ref 0–5)

## 2021-01-14 ENCOUNTER — OFFICE VISIT (OUTPATIENT)
Dept: GASTROENTEROLOGY | Facility: CLINIC | Age: 51
End: 2021-01-14
Payer: COMMERCIAL

## 2021-01-14 VITALS
SYSTOLIC BLOOD PRESSURE: 135 MMHG | DIASTOLIC BLOOD PRESSURE: 89 MMHG | HEART RATE: 66 BPM | HEIGHT: 67 IN | BODY MASS INDEX: 28.09 KG/M2 | WEIGHT: 179 LBS

## 2021-01-14 DIAGNOSIS — R06.6 HICCOUGH: ICD-10-CM

## 2021-01-14 DIAGNOSIS — K21.00 GASTROESOPHAGEAL REFLUX DISEASE WITH ESOPHAGITIS WITHOUT HEMORRHAGE: Primary | ICD-10-CM

## 2021-01-14 PROCEDURE — 1036F TOBACCO NON-USER: CPT | Performed by: INTERNAL MEDICINE

## 2021-01-14 PROCEDURE — 99214 OFFICE O/P EST MOD 30 MIN: CPT | Performed by: INTERNAL MEDICINE

## 2021-01-14 PROCEDURE — 3008F BODY MASS INDEX DOCD: CPT | Performed by: INTERNAL MEDICINE

## 2021-01-14 RX ORDER — PANTOPRAZOLE SODIUM 40 MG/1
40 TABLET, DELAYED RELEASE ORAL DAILY
Qty: 90 TABLET | Refills: 2 | Status: SHIPPED | OUTPATIENT
Start: 2021-01-14 | End: 2022-08-06

## 2021-01-14 RX ORDER — PANTOPRAZOLE SODIUM 40 MG/1
40 TABLET, DELAYED RELEASE ORAL
COMMUNITY
Start: 2020-10-01 | End: 2021-09-27 | Stop reason: SDUPTHER

## 2021-01-14 NOTE — PROGRESS NOTES
[address] [facility] - Outpatient Follow-up Note  David Oh 53 y o  male MRN: [id number]  Encounter: [id number]          ASSESSMENT AND PLAN:      1  Gastroesophageal reflux disease with esophagitis without hemorrhage  -     pantoprazole (PROTONIX) 40 mg tablet; Take 1 tablet (40 mg total) by mouth daily  Patient has significant heartburn, burping and bloating  She is having heartburn and indigestion after each meal   He has cut down spicy food and tomato sauce  He is also consuming rare red wine  He has not had any weight loss or weight gain  He denies any chest pain, cough or wheezing  He will see me again in three months  He will take pantoprazole 40 mg a day  Of consider cutting it down to 20 mg in three months  We had a long discussion regarding taking proton pump inhibitor  Will try to limit this to only short-term basis  2  Hiccough  He cuffing happens mostly after eating something spicy  Patient was prescribed pantoprazole which he has never taken  ______________________________________________________________________    SUBJECTIVE:  See above  Patient has not taken his PPI as prescribed before  I have discussed with him regarding taking this medication on a regular basis 1/2 hour before breakfast   He denies any other significant symptoms at this time  He is active  He does run on a regular basis  REVIEW OF SYSTEMS IS OTHERWISE NEGATIVE        Historical Information   Past Medical History:   Diagnosis Date    Bunion     Resolved 5/3/2015     Closed fracture of base of metatarsal bone     Last assessed 5/8/2006     Herpes zoster     Last assessed 8/21/2006     Pes planus, congenital     Resolved 10/9/2017     Plantar fibromatosis     Resolved 10/9/2017     Skin neoplasm     Resolved 2/21/2016     Verruca plana     Resolved 10/9/2017      Past Surgical History:   Procedure Laterality Date    HERNIA REPAIR      UMBILICAL HERNIA REPAIR       Social History   Social History     Substance and Sexual Activity   Alcohol Use Yes    Comment: social     Social History     Substance and Sexual Activity   Drug Use No     Social History     Tobacco Use   Smoking Status Never Smoker   Smokeless Tobacco Never Used     Family History   Problem Relation Age of Onset    Diabetes Mother     Gout Mother     Hypertension Mother     Lung cancer Father     Ulcerative colitis Brother        Meds/Allergies       Current Outpatient Medications:     cholecalciferol (VITAMIN D3) 400 units tablet    Multiple Vitamin (MULTI VITAMIN DAILY) TABS    Quercetin 250 MG TABS    Omega-3 Fatty Acids (fish oil) 1,000 mg    pantoprazole (PROTONIX) 40 mg tablet    pantoprazole (PROTONIX) 40 mg tablet    sildenafil (VIAGRA) 100 mg tablet    Allergies   Allergen Reactions    Cefadroxil Hives    Cefdinir     Cortizone-10  [Hydrocortisone]     Penicillins      Reaction Date: [id number];     Sulfamethoxazole-Trimethoprim Rash           Objective     Blood pressure 135/89, pulse 66, height 5' 7" (1 702 m), weight 81 2 kg (179 lb)  Body mass index is 28 04 kg/m²  PHYSICAL EXAM:      General Appearance:   Alert, cooperative, no distress   HEENT:   Normocephalic, atraumatic, anicteric  Neck:  Supple, symmetrical, trachea midline   Lungs:   Clear to auscultation bilaterally; no rales, rhonchi or wheezing; respirations unlabored    Heart[de-identified]   Regular rate and rhythm; no murmur  Abdomen:   Soft, non-tender, non-distended; normal bowel sounds; no masses, no organomegaly    Genitalia:   Deferred    Rectal:   Deferred    Extremities:  No cyanosis, clubbing or edema    Skin:  No jaundice, rashes, or lesions    Lymph nodes:  No palpable cervical lymphadenopathy        Lab Results:   No visits with results within 1 Day(s) from this visit     Latest known visit with results is:   Orders Only on 11/24/2020   Component Date Value    Gliadin IgA 11/24/2020 3     Gliadin IgG 11/24/2020 5     TISSUE TRANSGLUTAMINASE * 11/24/2020 <2     Tissue Transglut Ab IGG 11/24/2020 6*    Endomysial IgA 11/24/2020 Negative     IgA 11/24/2020 122          Radiology Results:   No results found

## 2021-04-08 DIAGNOSIS — Z23 ENCOUNTER FOR IMMUNIZATION: ICD-10-CM

## 2021-05-06 ENCOUNTER — OFFICE VISIT (OUTPATIENT)
Dept: GASTROENTEROLOGY | Facility: CLINIC | Age: 51
End: 2021-05-06
Payer: COMMERCIAL

## 2021-05-06 VITALS
HEART RATE: 59 BPM | BODY MASS INDEX: 27.31 KG/M2 | DIASTOLIC BLOOD PRESSURE: 90 MMHG | SYSTOLIC BLOOD PRESSURE: 126 MMHG | HEIGHT: 67 IN | WEIGHT: 174 LBS

## 2021-05-06 DIAGNOSIS — R14.2 FLATULENCE, ERUCTATION AND GAS PAIN: Primary | ICD-10-CM

## 2021-05-06 DIAGNOSIS — Z12.11 ENCOUNTER FOR SCREENING COLONOSCOPY: ICD-10-CM

## 2021-05-06 DIAGNOSIS — N41.1 CHRONIC PROSTATITIS: ICD-10-CM

## 2021-05-06 DIAGNOSIS — R14.1 FLATULENCE, ERUCTATION AND GAS PAIN: Primary | ICD-10-CM

## 2021-05-06 DIAGNOSIS — R10.10 PAIN OF UPPER ABDOMEN: ICD-10-CM

## 2021-05-06 DIAGNOSIS — R14.3 FLATULENCE, ERUCTATION AND GAS PAIN: Primary | ICD-10-CM

## 2021-05-06 PROCEDURE — 3008F BODY MASS INDEX DOCD: CPT | Performed by: INTERNAL MEDICINE

## 2021-05-06 PROCEDURE — 99214 OFFICE O/P EST MOD 30 MIN: CPT | Performed by: INTERNAL MEDICINE

## 2021-05-06 PROCEDURE — 1036F TOBACCO NON-USER: CPT | Performed by: INTERNAL MEDICINE

## 2021-05-06 NOTE — PROGRESS NOTES
[facility] - Outpatient Follow-up Note  Mily Tubbs 53 y o  male MRN: [id number]  Encounter: [id number]          ASSESSMENT AND PLAN:      1  Flatulence, eructation and gas pain  Patient complains of flatulence and burping  He has eructation  He denies any significant heartburn  He is currently on PPI  He denies any chest pain, cough or wheezing  There is no weight loss or weight gain  He has some nausea which has been recurrent  There is no dysphagia or odynophagia  Have discussed regarding reflux symptom  He probably has aerophagia  He does have significant anxiety and worries from work  Anxiety control has been explained  Patient will try to do that  He may need anxiety takes in the future depending on his clinical course  - PAT Covid Screening; Future  - US abdomen complete; Future  - Comprehensive metabolic panel; Future  - CBC and differential; Future    2  Pain of upper abdomen    Complains of pain in the epigastric area  He had  Midline hernia repair  He has mesh placement there  He does experience pain in the epigastric area  This mostly food related  He denies any change in bowel habits or rectal bleeding  A upper abdominal ultrasound will be ordered  Biliary tract disease has to be excluded  - US abdomen complete; Future  - Comprehensive metabolic panel; Future  - CBC and differential; Future    3  Encounter for screening colonoscopy    Patient is turning 51 next month  Screening colonoscopy will be scheduled  - Colonoscopy; Future    4  Chronic prostatitis    He has history of prostatitis  Will order PSA along with CBC and CMP  Patient understands  - PSA Total, Diagnostic; Future    ______________________________________________________________________    SUBJECTIVE:    Epigastric discomfort, burping and bloating  Nausea is present  No vomiting  REVIEW OF SYSTEMS IS OTHERWISE NEGATIVE        Historical Information   Past Medical History:   Diagnosis Date    Bunion     Resolved 5/3/2015     Closed fracture of base of metatarsal bone     Last assessed 5/8/2006     Gastritis     GERD (gastroesophageal reflux disease)     Herpes zoster     Last assessed 8/21/2006     Pes planus, congenital     Resolved 10/9/2017     Plantar fibromatosis     Resolved 10/9/2017     Skin neoplasm     Resolved 2/21/2016     Verruca plana     Resolved 10/9/2017      Past Surgical History:   Procedure Laterality Date    HERNIA REPAIR      UMBILICAL HERNIA REPAIR      UPPER GASTROINTESTINAL ENDOSCOPY       Social History   Social History     Substance and Sexual Activity   Alcohol Use Yes    Comment: social     Social History     Substance and Sexual Activity   Drug Use No     Social History     Tobacco Use   Smoking Status Never Smoker   Smokeless Tobacco Never Used     Family History   Problem Relation Age of Onset    Diabetes Mother     Gout Mother     Hypertension Mother     Lung cancer Father     Ulcerative colitis Brother        Meds/Allergies       Current Outpatient Medications:     pantoprazole (PROTONIX) 40 mg tablet    Quercetin 250 MG TABS    cholecalciferol (VITAMIN D3) 400 units tablet    Multiple Vitamin (MULTI VITAMIN DAILY) TABS    Omega-3 Fatty Acids (fish oil) 1,000 mg    pantoprazole (PROTONIX) 40 mg tablet    sildenafil (VIAGRA) 100 mg tablet    Allergies   Allergen Reactions    Cefadroxil Hives    Cefdinir     Cortizone-10  [Hydrocortisone]     Penicillins      Reaction Date: [id number];     Sulfamethoxazole-Trimethoprim Rash           Objective     Blood pressure 126/90, pulse 59, height 5' 7" (1 702 m), weight 78 9 kg (174 lb)  Body mass index is 27 25 kg/m²        PHYSICAL EXAM:      General Appearance:   Alert, cooperative, no distress   HEENT:   Normocephalic, atraumatic, anicteric      Neck:  Supple, symmetrical, trachea midline   Lungs:   Clear to auscultation bilaterally; no rales, rhonchi or wheezing; respirations unlabored    Heart[de-identified]   Regular rate and rhythm; no murmur, rub, or gallop  Abdomen:   Soft, non-tender, non-distended; normal bowel sounds; no masses, no organomegaly   Midline scar noted   Some tenderness in the midline epigastric area   Liver is not enlarged   Genitalia:   Deferred    Rectal:   Deferred    Extremities:  No cyanosis, clubbing or edema    Pulses:  2+ and symmetric    Skin:  No jaundice, rashes, or lesions    Lymph nodes:  No palpable cervical lymphadenopathy        Lab Results:   No visits with results within 1 Day(s) from this visit  Latest known visit with results is:   Orders Only on 11/24/2020   Component Date Value    Gliadin IgA 11/24/2020 3     Gliadin IgG 11/24/2020 5     TISSUE TRANSGLUTAMINASE * 11/24/2020 <2     Tissue Transglut Ab IGG 11/24/2020 6*    Endomysial IgA 11/24/2020 Negative     IgA 11/24/2020 122          Radiology Results:   No results found

## 2021-05-13 ENCOUNTER — HOSPITAL ENCOUNTER (OUTPATIENT)
Dept: RADIOLOGY | Facility: HOSPITAL | Age: 51
Discharge: HOME | End: 2021-05-13
Attending: INTERNAL MEDICINE

## 2021-05-13 DIAGNOSIS — R14.2 FLATULENCE, ERUCTATION AND GAS PAIN: ICD-10-CM

## 2021-05-13 DIAGNOSIS — N52.9 ERECTILE DYSFUNCTION, UNSPECIFIED ERECTILE DYSFUNCTION TYPE: ICD-10-CM

## 2021-05-13 DIAGNOSIS — R14.3 FLATULENCE, ERUCTATION AND GAS PAIN: ICD-10-CM

## 2021-05-13 DIAGNOSIS — R10.10 PAIN OF UPPER ABDOMEN: ICD-10-CM

## 2021-05-13 DIAGNOSIS — R14.1 FLATULENCE, ERUCTATION AND GAS PAIN: ICD-10-CM

## 2021-05-16 DIAGNOSIS — N52.9 ERECTILE DYSFUNCTION, UNSPECIFIED ERECTILE DYSFUNCTION TYPE: ICD-10-CM

## 2021-05-17 RX ORDER — SILDENAFIL 100 MG/1
TABLET, FILM COATED ORAL
Qty: 30 TABLET | Refills: 5 | OUTPATIENT
Start: 2021-05-17

## 2021-05-17 RX ORDER — SILDENAFIL 100 MG/1
100 TABLET, FILM COATED ORAL DAILY PRN
Qty: 30 TABLET | Refills: 1 | Status: SHIPPED | OUTPATIENT
Start: 2021-05-17 | End: 2021-09-27 | Stop reason: ALTCHOICE

## 2021-05-17 NOTE — TELEPHONE ENCOUNTER
Pt stated he only wanted to come in for his yearly and the rx does not need to be filled  Pt will call when it gets closer to September

## 2021-05-21 ENCOUNTER — HOSPITAL ENCOUNTER (OUTPATIENT)
Dept: RADIOLOGY | Facility: HOSPITAL | Age: 51
Discharge: HOME | End: 2021-05-21
Attending: INTERNAL MEDICINE
Payer: COMMERCIAL

## 2021-05-21 PROCEDURE — 76700 US EXAM ABDOM COMPLETE: CPT

## 2021-07-06 ENCOUNTER — OFFICE VISIT (OUTPATIENT)
Dept: GASTROENTEROLOGY | Facility: CLINIC | Age: 51
End: 2021-07-06
Payer: COMMERCIAL

## 2021-07-06 VITALS
HEART RATE: 64 BPM | SYSTOLIC BLOOD PRESSURE: 130 MMHG | WEIGHT: 173 LBS | DIASTOLIC BLOOD PRESSURE: 89 MMHG | BODY MASS INDEX: 27.15 KG/M2 | HEIGHT: 67 IN

## 2021-07-06 DIAGNOSIS — K21.9 GASTROESOPHAGEAL REFLUX DISEASE WITHOUT ESOPHAGITIS: ICD-10-CM

## 2021-07-06 DIAGNOSIS — Z12.11 ENCOUNTER FOR SCREENING COLONOSCOPY: ICD-10-CM

## 2021-07-06 DIAGNOSIS — K29.50 CHRONIC GASTRITIS WITHOUT BLEEDING, UNSPECIFIED GASTRITIS TYPE: ICD-10-CM

## 2021-07-06 DIAGNOSIS — R10.13 EPIGASTRIC PAIN: Primary | ICD-10-CM

## 2021-07-06 PROCEDURE — 99214 OFFICE O/P EST MOD 30 MIN: CPT | Performed by: INTERNAL MEDICINE

## 2021-07-06 PROCEDURE — 3008F BODY MASS INDEX DOCD: CPT | Performed by: INTERNAL MEDICINE

## 2021-07-06 PROCEDURE — 1036F TOBACCO NON-USER: CPT | Performed by: INTERNAL MEDICINE

## 2021-07-06 NOTE — PROGRESS NOTES
[facility] - Outpatient Follow-up Note  Kate Oh 49 y o  male MRN: [id number]  Encounter: [id number]          ASSESSMENT AND PLAN:      1  Epigastric pain   patient has history of gastroesophageal reflux disease  He also has history of chronic epigastric pain and discomfort  He had hernia surgery in the epigastric area with mesh placement  It is unclear if his pain is mostly from the mesh induced irritation and fibrosis or adhesion this area  Peptic disease unlikely  He does have history of significant gastritis and reflux  There was no ulcer noted  Patient has felt better on Pepto-Bismol which he is taking now  I have advised him to take Pepto-Bismol for about 4-6 weeks  He will take 2 tablets or 2 tsp 3 times a day  Upper endoscopy will be done for evaluation     - EGD; Future    2  Gastroesophageal reflux disease without esophagitis    Patient has epigastric discomfort and heartburn  Discomfort is mostly food related  See above discussion     - EGD; Future    3  Chronic gastritis without bleeding, unspecified gastritis type    See above discussion  4  Encounter for screening colonoscopy    Patient is age 49  Screening colonoscopy will be needed  ______________________________________________________________________    SUBJECTIVE:    Epigastric discomfort and occasional pain  Reflux symptoms  There is no nausea or vomiting  There is no dysphagia or odynophagia  Denies any chest pain, cough or wheezing  Denies any palpitation, orthopnea or exertional dyspnea  He denies any dysuria hematuria  He denies any history of arthritis or degenerative joint disease  REVIEW OF SYSTEMS IS OTHERWISE NEGATIVE        Historical Information   Past Medical History:   Diagnosis Date    Bunion     Resolved 5/3/2015     Closed fracture of base of metatarsal bone     Last assessed 5/8/2006     Gastritis     GERD (gastroesophageal reflux disease)     Herpes zoster Last assessed 8/21/2006     Pes planus, congenital     Resolved 10/9/2017     Plantar fibromatosis     Resolved 10/9/2017     Skin neoplasm     Resolved 2/21/2016     Verruca plana     Resolved 10/9/2017      Past Surgical History:   Procedure Laterality Date    HERNIA REPAIR      UMBILICAL HERNIA REPAIR      UPPER GASTROINTESTINAL ENDOSCOPY       Social History   Social History     Substance and Sexual Activity   Alcohol Use Yes    Comment: social     Social History     Substance and Sexual Activity   Drug Use No     Social History     Tobacco Use   Smoking Status Never Smoker   Smokeless Tobacco Never Used     Family History   Problem Relation Age of Onset    Diabetes Mother     Gout Mother     Hypertension Mother     Lung cancer Father     Ulcerative colitis Brother        Meds/Allergies       Current Outpatient Medications:     cholecalciferol (VITAMIN D3) 400 units tablet    Multiple Vitamin (MULTI VITAMIN DAILY) TABS    pantoprazole (PROTONIX) 40 mg tablet    pantoprazole (PROTONIX) 40 mg tablet    Omega-3 Fatty Acids (fish oil) 1,000 mg    Quercetin 250 MG TABS    sildenafil (VIAGRA) 100 mg tablet    Allergies   Allergen Reactions    Cefadroxil Hives    Cefdinir     Cortizone-10  [Hydrocortisone]     Penicillins      Reaction Date: [id number];     Sulfamethoxazole-Trimethoprim Rash           Objective     Blood pressure 130/89, pulse 64, height 5' 7" (1 702 m), weight 78 5 kg (173 lb)  Body mass index is 27 1 kg/m²  PHYSICAL EXAM:      General Appearance:   Alert, cooperative, no distress   HEENT:   Normocephalic, atraumatic, anicteric      Neck:  Supple, symmetrical, trachea midline   Lungs:   Clear to auscultation bilaterally; no rales, rhonchi or wheezing; respirations unlabored    Heart[de-identified]   Regular rate and rhythm; no murmur, rub, or gallop     Abdomen:   Soft, non-tender, non-distended; normal bowel sounds; no masses, no organomegaly   There is nodularity in the midline in the supraumbilical area   This relates to the area very had hernia surgery  There is no guarding or rigidity   Genitalia:   Deferred    Rectal:   Deferred    Extremities:  No cyanosis, clubbing or edema    Pulses:  2+ and symmetric    Skin:  No jaundice, rashes, or lesions    Lymph nodes:  No palpable cervical lymphadenopathy        Lab Results:   No visits with results within 1 Day(s) from this visit  Latest known visit with results is:   Orders Only on 11/24/2020   Component Date Value    Gliadin IgA 11/24/2020 3     Gliadin IgG 11/24/2020 5     TISSUE TRANSGLUTAMINASE * 11/24/2020 <2     Tissue Transglut Ab IGG 11/24/2020 6*    Endomysial IgA 11/24/2020 Negative     IgA 11/24/2020 122          Radiology Results:   No results found

## 2021-09-27 ENCOUNTER — HOSPITAL ENCOUNTER (OUTPATIENT)
Dept: GASTROENTEROLOGY | Facility: AMBULATORY SURGERY CENTER | Age: 51
Discharge: HOME | End: 2021-09-27
Payer: COMMERCIAL

## 2021-09-27 ENCOUNTER — ANESTHESIA (OUTPATIENT)
Dept: GASTROENTEROLOGY | Facility: AMBULATORY SURGERY CENTER | Age: 51
End: 2021-09-27

## 2021-09-27 ENCOUNTER — ANESTHESIA EVENT (OUTPATIENT)
Dept: GASTROENTEROLOGY | Facility: AMBULATORY SURGERY CENTER | Age: 51
End: 2021-09-27

## 2021-09-27 VITALS
DIASTOLIC BLOOD PRESSURE: 81 MMHG | HEIGHT: 67 IN | TEMPERATURE: 97.8 F | WEIGHT: 165 LBS | HEART RATE: 61 BPM | OXYGEN SATURATION: 98 % | RESPIRATION RATE: 18 BRPM | SYSTOLIC BLOOD PRESSURE: 119 MMHG | BODY MASS INDEX: 25.9 KG/M2

## 2021-09-27 DIAGNOSIS — R10.13 EPIGASTRIC PAIN: ICD-10-CM

## 2021-09-27 DIAGNOSIS — Z12.11 ENCOUNTER FOR SCREENING COLONOSCOPY: ICD-10-CM

## 2021-09-27 DIAGNOSIS — K21.9 GASTROESOPHAGEAL REFLUX DISEASE WITHOUT ESOPHAGITIS: ICD-10-CM

## 2021-09-27 PROBLEM — Z86.0100 PERSONAL HISTORY OF COLONIC POLYPS: Status: ACTIVE | Noted: 2021-09-27

## 2021-09-27 PROBLEM — Z86.010 PERSONAL HISTORY OF COLONIC POLYPS: Status: ACTIVE | Noted: 2021-09-27

## 2021-09-27 PROCEDURE — 00813 ANES UPR LWR GI NDSC PX: CPT | Performed by: NURSE ANESTHETIST, CERTIFIED REGISTERED

## 2021-09-27 PROCEDURE — 43239 EGD BIOPSY SINGLE/MULTIPLE: CPT | Performed by: INTERNAL MEDICINE

## 2021-09-27 PROCEDURE — 45380 COLONOSCOPY AND BIOPSY: CPT | Performed by: INTERNAL MEDICINE

## 2021-09-27 RX ORDER — PROPOFOL 10 MG/ML
INJECTION, EMULSION INTRAVENOUS AS NEEDED
Status: DISCONTINUED | OUTPATIENT
Start: 2021-09-27 | End: 2021-09-27

## 2021-09-27 RX ORDER — SODIUM CHLORIDE 9 MG/ML
125 INJECTION, SOLUTION INTRAVENOUS CONTINUOUS
Status: DISCONTINUED | OUTPATIENT
Start: 2021-09-27 | End: 2021-10-01 | Stop reason: HOSPADM

## 2021-09-27 RX ORDER — SODIUM CHLORIDE 9 MG/ML
20 INJECTION, SOLUTION INTRAVENOUS CONTINUOUS
Status: DISCONTINUED | OUTPATIENT
Start: 2021-09-27 | End: 2021-10-01 | Stop reason: HOSPADM

## 2021-09-27 RX ORDER — SODIUM CHLORIDE 9 MG/ML
INJECTION, SOLUTION INTRAVENOUS CONTINUOUS PRN
Status: DISCONTINUED | OUTPATIENT
Start: 2021-09-27 | End: 2021-09-27

## 2021-09-27 RX ADMIN — PROPOFOL 100 MG: 10 INJECTION, EMULSION INTRAVENOUS at 11:37

## 2021-09-27 RX ADMIN — SODIUM CHLORIDE: 9 INJECTION, SOLUTION INTRAVENOUS at 11:25

## 2021-09-27 RX ADMIN — SODIUM CHLORIDE: 9 INJECTION, SOLUTION INTRAVENOUS at 11:28

## 2021-09-27 RX ADMIN — PROPOFOL 100 MG: 10 INJECTION, EMULSION INTRAVENOUS at 11:31

## 2021-09-27 RX ADMIN — PROPOFOL 100 MG: 10 INJECTION, EMULSION INTRAVENOUS at 11:44

## 2021-09-27 RX ADMIN — PROPOFOL 100 MG: 10 INJECTION, EMULSION INTRAVENOUS at 11:25

## 2021-09-27 RX ADMIN — PROPOFOL 50 MG: 10 INJECTION, EMULSION INTRAVENOUS at 11:42

## 2021-09-27 NOTE — ANESTHESIA PREPROCEDURE EVALUATION
Procedure:  COLONOSCOPY  EGD    Relevant Problems   GI/HEPATIC   (+) Gastroesophageal reflux disease with esophagitis without hemorrhage        Physical Exam    Airway    Mallampati score: II  TM Distance: >3 FB  Neck ROM: full     Dental   No notable dental hx     Cardiovascular  Rhythm: regular, Rate: normal, Cardiovascular exam normal    Pulmonary  Pulmonary exam normal Breath sounds clear to auscultation,     Other Findings        Anesthesia Plan  ASA Score- 2     Anesthesia Type- IV sedation with anesthesia with ASA Monitors  Additional Monitors:   Airway Plan:           Plan Factors-Exercise tolerance (METS): >4 METS  Chart reviewed  EKG reviewed  Imaging results reviewed  Existing labs reviewed  Patient summary reviewed  Patient is not a current smoker  Induction- intravenous  Postoperative Plan-     Informed Consent- Anesthetic plan and risks discussed with patient

## 2021-09-27 NOTE — ANESTHESIA POSTPROCEDURE EVALUATION
Post-Op Assessment Note    CV Status:  Stable  Pain Score: 0    Pain management: adequate     Mental Status:  Alert   Hydration Status:  Stable   PONV Controlled:  None   Airway Patency:  Patent      Post Op Vitals Reviewed: Yes      Staff: CRNA         No complications documented      BP   116/70   Temp      Pulse  54   Resp   18   SpO2   100

## 2021-09-30 ENCOUNTER — TELEPHONE (OUTPATIENT)
Dept: FAMILY MEDICINE CLINIC | Facility: CLINIC | Age: 51
End: 2021-09-30

## 2021-09-30 DIAGNOSIS — Z12.5 PROSTATE CANCER SCREENING: ICD-10-CM

## 2021-09-30 DIAGNOSIS — Z13.1 SCREENING FOR DIABETES MELLITUS (DM): ICD-10-CM

## 2021-09-30 DIAGNOSIS — Z13.89 SCREENING FOR CARDIOVASCULAR, RESPIRATORY, AND GENITOURINARY DISEASES: Primary | ICD-10-CM

## 2021-09-30 DIAGNOSIS — Z13.83 SCREENING FOR CARDIOVASCULAR, RESPIRATORY, AND GENITOURINARY DISEASES: Primary | ICD-10-CM

## 2021-09-30 DIAGNOSIS — Z13.6 SCREENING FOR CARDIOVASCULAR, RESPIRATORY, AND GENITOURINARY DISEASES: Primary | ICD-10-CM

## 2021-10-08 ENCOUNTER — RA CDI HCC (OUTPATIENT)
Dept: OTHER | Facility: HOSPITAL | Age: 51
End: 2021-10-08

## 2021-10-13 LAB
ALBUMIN SERPL-MCNC: 5 G/DL (ref 4–5)
ALBUMIN/GLOB SERPL: 2.3 {RATIO} (ref 1.2–2.2)
ALP SERPL-CCNC: 50 IU/L (ref 44–121)
ALT SERPL-CCNC: 15 IU/L (ref 0–44)
AST SERPL-CCNC: 20 IU/L (ref 0–40)
BILIRUB SERPL-MCNC: 1 MG/DL (ref 0–1.2)
BUN SERPL-MCNC: 17 MG/DL (ref 6–24)
BUN/CREAT SERPL: 14 (ref 9–20)
CALCIUM SERPL-MCNC: 9.8 MG/DL (ref 8.7–10.2)
CHLORIDE SERPL-SCNC: 100 MMOL/L (ref 96–106)
CHOLEST SERPL-MCNC: 181 MG/DL (ref 100–199)
CO2 SERPL-SCNC: 26 MMOL/L (ref 20–29)
CREAT SERPL-MCNC: 1.2 MG/DL (ref 0.76–1.27)
GLOBULIN SER-MCNC: 2.2 G/DL (ref 1.5–4.5)
GLUCOSE SERPL-MCNC: 86 MG/DL (ref 65–99)
HDLC SERPL-MCNC: 47 MG/DL
LDLC SERPL CALC-MCNC: 120 MG/DL (ref 0–99)
MICRODELETION SYND BLD/T FISH: NORMAL
POTASSIUM SERPL-SCNC: 4.4 MMOL/L (ref 3.5–5.2)
PROT SERPL-MCNC: 7.2 G/DL (ref 6–8.5)
PSA SERPL-MCNC: 0.7 NG/ML (ref 0–4)
SL AMB EGFR AFRICAN AMERICAN: 81 ML/MIN/1.73
SL AMB EGFR NON AFRICAN AMERICAN: 70 ML/MIN/1.73
SODIUM SERPL-SCNC: 139 MMOL/L (ref 134–144)
TRIGL SERPL-MCNC: 75 MG/DL (ref 0–149)

## 2021-10-20 ENCOUNTER — OFFICE VISIT (OUTPATIENT)
Dept: FAMILY MEDICINE CLINIC | Facility: CLINIC | Age: 51
End: 2021-10-20
Payer: COMMERCIAL

## 2021-10-20 VITALS
RESPIRATION RATE: 16 BRPM | WEIGHT: 172 LBS | HEART RATE: 62 BPM | HEIGHT: 67 IN | OXYGEN SATURATION: 97 % | BODY MASS INDEX: 27 KG/M2 | DIASTOLIC BLOOD PRESSURE: 72 MMHG | SYSTOLIC BLOOD PRESSURE: 116 MMHG | TEMPERATURE: 97.9 F

## 2021-10-20 DIAGNOSIS — Z23 IMMUNIZATION DUE: ICD-10-CM

## 2021-10-20 DIAGNOSIS — H35.712 CENTRAL SEROUS RETINOPATHY, LEFT: ICD-10-CM

## 2021-10-20 DIAGNOSIS — Z00.00 HEALTHCARE MAINTENANCE: Primary | ICD-10-CM

## 2021-10-20 PROCEDURE — 90472 IMMUNIZATION ADMIN EACH ADD: CPT

## 2021-10-20 PROCEDURE — 90471 IMMUNIZATION ADMIN: CPT

## 2021-10-20 PROCEDURE — 90682 RIV4 VACC RECOMBINANT DNA IM: CPT

## 2021-10-20 PROCEDURE — 99396 PREV VISIT EST AGE 40-64: CPT | Performed by: FAMILY MEDICINE

## 2021-10-20 PROCEDURE — 3008F BODY MASS INDEX DOCD: CPT | Performed by: FAMILY MEDICINE

## 2021-10-20 PROCEDURE — 90715 TDAP VACCINE 7 YRS/> IM: CPT

## 2021-10-20 PROCEDURE — 1036F TOBACCO NON-USER: CPT | Performed by: FAMILY MEDICINE

## 2021-10-20 PROCEDURE — 3725F SCREEN DEPRESSION PERFORMED: CPT | Performed by: FAMILY MEDICINE

## 2021-12-02 ENCOUNTER — OFFICE VISIT (OUTPATIENT)
Dept: GASTROENTEROLOGY | Facility: CLINIC | Age: 51
End: 2021-12-02
Payer: COMMERCIAL

## 2021-12-02 VITALS
WEIGHT: 175 LBS | HEART RATE: 66 BPM | DIASTOLIC BLOOD PRESSURE: 83 MMHG | BODY MASS INDEX: 27.47 KG/M2 | SYSTOLIC BLOOD PRESSURE: 125 MMHG | HEIGHT: 67 IN

## 2021-12-02 DIAGNOSIS — K66.0 ABDOMINAL ADHESIONS: ICD-10-CM

## 2021-12-02 DIAGNOSIS — R10.13 EPIGASTRIC PAIN: Primary | ICD-10-CM

## 2021-12-02 PROCEDURE — 1036F TOBACCO NON-USER: CPT | Performed by: INTERNAL MEDICINE

## 2021-12-02 PROCEDURE — 3008F BODY MASS INDEX DOCD: CPT | Performed by: INTERNAL MEDICINE

## 2021-12-02 PROCEDURE — 99214 OFFICE O/P EST MOD 30 MIN: CPT | Performed by: INTERNAL MEDICINE

## 2021-12-10 ENCOUNTER — HOSPITAL ENCOUNTER (OUTPATIENT)
Dept: RADIOLOGY | Facility: HOSPITAL | Age: 51
Discharge: HOME | End: 2021-12-10
Attending: INTERNAL MEDICINE
Payer: COMMERCIAL

## 2021-12-10 DIAGNOSIS — K66.0 ABDOMINAL ADHESIONS: ICD-10-CM

## 2021-12-10 DIAGNOSIS — R10.13 EPIGASTRIC PAIN: ICD-10-CM

## 2021-12-10 PROCEDURE — 74177 CT ABD & PELVIS W/CONTRAST: CPT

## 2021-12-10 PROCEDURE — G1004 CDSM NDSC: HCPCS

## 2021-12-10 RX ADMIN — IOHEXOL 100 ML: 350 INJECTION, SOLUTION INTRAVENOUS at 08:02

## 2021-12-14 DIAGNOSIS — R10.13 EPIGASTRIC PAIN: Primary | ICD-10-CM

## 2021-12-28 ENCOUNTER — HOSPITAL ENCOUNTER (OUTPATIENT)
Dept: RADIOLOGY | Facility: HOSPITAL | Age: 51
Discharge: HOME | End: 2021-12-28
Attending: INTERNAL MEDICINE
Payer: COMMERCIAL

## 2021-12-28 DIAGNOSIS — R10.13 EPIGASTRIC PAIN: ICD-10-CM

## 2021-12-28 PROCEDURE — 78227 HEPATOBIL SYST IMAGE W/DRUG: CPT

## 2021-12-28 PROCEDURE — A9537 TC99M MEBROFENIN: HCPCS

## 2021-12-28 PROCEDURE — G1004 CDSM NDSC: HCPCS

## 2022-01-04 ENCOUNTER — OFFICE VISIT (OUTPATIENT)
Dept: GASTROENTEROLOGY | Facility: CLINIC | Age: 52
End: 2022-01-04
Payer: COMMERCIAL

## 2022-01-04 VITALS
SYSTOLIC BLOOD PRESSURE: 136 MMHG | DIASTOLIC BLOOD PRESSURE: 86 MMHG | HEART RATE: 60 BPM | BODY MASS INDEX: 27.62 KG/M2 | WEIGHT: 176 LBS | HEIGHT: 67 IN

## 2022-01-04 DIAGNOSIS — R10.13 EPIGASTRIC PAIN: ICD-10-CM

## 2022-01-04 DIAGNOSIS — K82.8 BILIARY DYSKINESIA: Primary | ICD-10-CM

## 2022-01-04 PROCEDURE — 99214 OFFICE O/P EST MOD 30 MIN: CPT | Performed by: INTERNAL MEDICINE

## 2022-01-04 NOTE — PROGRESS NOTES
[facility] - Outpatient Follow-up Note  Artis Crespo 49 y o  male MRN: [id number]  Encounter: [id number]          ASSESSMENT AND PLAN:      1  Biliary dyskinesia  Patient recently had a decided scan with CCK evaluation which revealed nonfunctional gallbladder  Patient continues to have epigastric discomfort mostly in the mid abdomen above the belly button  The pain comes after eating  Caffeinated drinks six, spicy food and chocolate seems to make pain worse  Patient also has some fatty food related pain  His abdominal pain was reproduced during CCK injection according to the patient  However the pain was not intense at the time of injection  We had a long discussion regarding biliary dyskinesia  I have explained the patient that there may be other etiology of his pain  However at this time does only abnormality we have to explain his pain  Patient had abdominal hernia surgery  There is a good possibility of adhesions in this area causing some of the pain also  CT scan was negative  Generally speaking the mesh is not visible on the CT scan  Patient will be referred to surgical service for evaluation and cholecystectomy  His surgery was done at [facility]   Patient wants to return back there  Referral given  2  Epigastric pain  See above discussion  Patient has history of reflux esophagitis also  Nonfunctional gallbladder on decided scan with CCK evaluation  Surgical referral made  ______________________________________________________________________    SUBJECTIVE:  Epigastric pain and discomfort  Burning sensation  Heartburn  Indigestion  REVIEW OF SYSTEMS IS OTHERWISE NEGATIVE        Historical Information   Past Medical History:   Diagnosis Date    Abdominal pain     started 4/2020    Bunion     Resolved 5/3/2015     Closed fracture of base of metatarsal bone     Last assessed 5/8/2006     Gastritis     GERD (gastroesophageal reflux disease)  Herpes zoster     Last assessed 8/21/2006     Pes planus, congenital     Resolved 10/9/2017     Plantar fibromatosis     Resolved 10/9/2017     Skin neoplasm     Resolved 2/21/2016     Verruca plana     Resolved 10/9/2017      Past Surgical History:   Procedure Laterality Date    HERNIA REPAIR      SKIN CANCER EXCISION Left     leg    UMBILICAL HERNIA REPAIR      UPPER GASTROINTESTINAL ENDOSCOPY       Social History   Social History     Substance and Sexual Activity   Alcohol Use Yes    Comment: social, 3-4 beers monthly     Social History     Substance and Sexual Activity   Drug Use No     Social History     Tobacco Use   Smoking Status Never Smoker   Smokeless Tobacco Never Used     Family History   Problem Relation Age of Onset    Diabetes Mother     Gout Mother     Hypertension Mother     Lung cancer Father     Ulcerative colitis Brother        Meds/Allergies       Current Outpatient Medications:     cholecalciferol (VITAMIN D3) 400 units tablet    Multiple Vitamin (MULTI VITAMIN DAILY) TABS    pantoprazole (PROTONIX) 40 mg tablet    Allergies   Allergen Reactions    Cefadroxil Hives    Cefdinir Swelling    Cortizone-10  [Hydrocortisone]     Penicillins      Reaction Date: [id number];     Sulfamethoxazole-Trimethoprim Rash           Objective     Blood pressure 136/86, pulse 60, height 5' 7" (1 702 m), weight 79 8 kg (176 lb)  Body mass index is 27 57 kg/m²  PHYSICAL EXAM:      General Appearance:   Alert, cooperative, no distress   HEENT:   Normocephalic, atraumatic, anicteric      Neck:  Supple, symmetrical, trachea midline   Lungs:   Clear to auscultation bilaterally; no rales, rhonchi or wheezing; respirations unlabored    Heart[de-identified]   Regular rate and rhythm; no murmur, rub, or gallop     Abdomen:   Soft, non-tender, non-distended; normal bowel sounds; no masses, no organomegaly    Genitalia:   Deferred    Rectal:   Deferred    Extremities:  No cyanosis, clubbing or edema  Pulses:  2+ and symmetric    Skin:  No jaundice, rashes, or lesions    Lymph nodes:  No palpable cervical lymphadenopathy        Lab Results:   No visits with results within 1 Day(s) from this visit  Latest known visit with results is:   Orders Only on 10/13/2021   Component Date Value    Glucose, Random 10/13/2021 86     BUN 10/13/2021 17     Creatinine 10/13/2021 1 20     eGFR Non  10/13/2021 70     eGFR  10/13/2021 81     SL AMB BUN/CREATININE RA* 10/13/2021 14     Sodium 10/13/2021 139     Potassium 10/13/2021 4 4     Chloride 10/13/2021 100     CO2 10/13/2021 26     CALCIUM 10/13/2021 9 8     Protein, Total 10/13/2021 7 2     Albumin 10/13/2021 5 0     Globulin, Total 10/13/2021 2 2     Albumin/Globulin Ratio 10/13/2021 2 3*    TOTAL BILIRUBIN 10/13/2021 1 0     Alk Phos Isoenzymes 10/13/2021 50     AST 10/13/2021 20     ALT 10/13/2021 15     Cholesterol, Total 10/13/2021 181     Triglycerides 10/13/2021 75     HDL 10/13/2021 47     LDL Calculated 10/13/2021 120*    Prostate Specific Antige* 10/13/2021 0 7     Interpretation 10/13/2021 Note          Radiology Results:   NM hepatobiliary w rx    Result Date: 12/28/2021  Narrative: HEPATOBILIARY SCAN WITH CHOLECYSTOKININ ADMINISTRATION INDICATION: R10 13: Epigastric pain COMPARISON:  Comparison is made to the gallbladder on coronal CT of abdomen dated December 10, 2021 TECHNIQUE:   Following the intravenous administration of 5 mCi Tc-99m labeled mebrofenin, dynamic abdominal images were obtained over a 60 minute time period  Images were performed in AP projection  FINDINGS: There is prompt, uniform accumulation with normal clearance of the radiopharmaceutical by the liver  There is normal filling of the intrahepatic ducts, common bile duct and gallbladder with normal excretion of the radiopharmaceutical into the duodenum    In order to evaluate the contractile response of the gallbladder to cholecystokinin stimulation, 1 6 mcg sincalide (0 02 mcg/kg) was administered by slow intravenous infusion over 60 minutes  These images demonstrate abnormal contraction of the gallbladder  The calculated gallbladder ejection fraction is 0 % (N = >38%)  The patient experienced no symptoms after CCK administration  Impression: 1  Visualization of the gallbladder without sonographic evidence for acute cholecystitis  2  Abnormal contractile response of the gallbladder to CCK with 0% ejection fraction at 60 minutes  Findings are consistent with chronic cholecystitis/biliary dyskinesia in the appropriate clinical setting  Workstation performed: [id number]     CT abdomen pelvis w contrast    Result Date: 12/14/2021  Narrative: CT ABDOMEN AND PELVIS WITH IV CONTRAST INDICATION:   R10 13: Epigastric pain K66 0: Peritoneal adhesions (postprocedural) (postinfection)  Constant abdominal pain  Mesh hernia surgery  ? Adhesion, Epigastric pain, Constant abdominal pain COMPARISON:  7/2/2010 TECHNIQUE:  CT examination of the abdomen and pelvis was performed  Axial, sagittal, and coronal 2D reformatted images were created from the source data and submitted for interpretation  Radiation dose length product (DLP) for this visit:  475 47 mGy-cm   This examination, like all CT scans performed in the [facility], was performed utilizing techniques to minimize radiation dose exposure, including the use of iterative  reconstruction and automated exposure control  IV Contrast:  100 mL of iohexol (OMNIPAQUE) Enteric Contrast:  Enteric contrast was administered  FINDINGS: ABDOMEN LOWER CHEST:  No clinically significant abnormality identified in the visualized lower chest  LIVER/BILIARY TREE:  Unremarkable  GALLBLADDER:  No calcified gallstones  No pericholecystic inflammatory change  SPLEEN:  Unremarkable  PANCREAS:  Unremarkable  ADRENAL GLANDS:  Unremarkable   KIDNEYS/URETERS:  No hydronephrosis or urinary tract calculus  One or more sharply circumscribed subcentimeter renal hypodensities are present, too small to accurately characterize, and statistically most likely benign findings  According to recent literature (Radiology 2019) no further workup of these findings is recommended  STOMACH AND BOWEL:  Unremarkable  APPENDIX:  No findings to suggest appendicitis  ABDOMINOPELVIC CAVITY:  No ascites  No pneumoperitoneum  No lymphadenopathy  VESSELS:  Unremarkable for patient's age  PELVIS REPRODUCTIVE ORGANS:  Unremarkable for patient's age  URINARY BLADDER:  Unremarkable  ABDOMINAL WALL/INGUINAL REGIONS:  Postoperative changes in the anterior abdominal wall with mesh noted in the midline image 33, series 2  No evidence of recurrent residual hernia  OSSEOUS STRUCTURES:  No acute fracture or destructive osseous lesion  Impression: 1  No evidence of bowel obstruction  No evidence of recurrent or residual hernia   Workstation performed: [id number]

## 2022-01-07 ENCOUNTER — OFFICE VISIT (OUTPATIENT)
Dept: SURGERY | Facility: CLINIC | Age: 52
End: 2022-01-07
Payer: COMMERCIAL

## 2022-01-07 VITALS
DIASTOLIC BLOOD PRESSURE: 76 MMHG | HEART RATE: 86 BPM | BODY MASS INDEX: 27.5 KG/M2 | TEMPERATURE: 97.4 F | WEIGHT: 175.2 LBS | HEIGHT: 67 IN | SYSTOLIC BLOOD PRESSURE: 126 MMHG

## 2022-01-07 DIAGNOSIS — K64.4 EXTERNAL HEMORRHOIDS: ICD-10-CM

## 2022-01-07 DIAGNOSIS — K82.8 BILIARY DYSKINESIA: Primary | ICD-10-CM

## 2022-01-07 DIAGNOSIS — R10.13 EPIGASTRIC PAIN: ICD-10-CM

## 2022-01-07 DIAGNOSIS — K29.50 CHRONIC GASTRITIS WITHOUT BLEEDING: ICD-10-CM

## 2022-01-07 DIAGNOSIS — K21.00 GASTROESOPHAGEAL REFLUX DISEASE WITH ESOPHAGITIS WITHOUT HEMORRHAGE: ICD-10-CM

## 2022-01-07 PROCEDURE — 3008F BODY MASS INDEX DOCD: CPT | Performed by: SPECIALIST

## 2022-01-07 PROCEDURE — 1036F TOBACCO NON-USER: CPT | Performed by: SPECIALIST

## 2022-01-07 PROCEDURE — 99204 OFFICE O/P NEW MOD 45 MIN: CPT | Performed by: SPECIALIST

## 2022-01-07 RX ORDER — HEPARIN SODIUM 5000 [USP'U]/ML
5000 INJECTION, SOLUTION INTRAVENOUS; SUBCUTANEOUS ONCE
Status: CANCELLED | OUTPATIENT
Start: 2022-03-01 | End: 2022-01-07

## 2022-01-07 RX ORDER — HEPARIN SODIUM 5000 [USP'U]/ML
5000 INJECTION, SOLUTION INTRAVENOUS; SUBCUTANEOUS EVERY 8 HOURS SCHEDULED
Status: CANCELLED | OUTPATIENT
Start: 2022-03-01

## 2022-01-07 RX ORDER — METRONIDAZOLE 500 MG/1
500 TABLET ORAL EVERY 8 HOURS SCHEDULED
Status: CANCELLED | OUTPATIENT
Start: 2022-03-01

## 2022-01-07 RX ORDER — LEVOFLOXACIN 5 MG/ML
500 INJECTION, SOLUTION INTRAVENOUS ONCE
Status: CANCELLED | OUTPATIENT
Start: 2022-03-01 | End: 2022-01-07

## 2022-01-07 NOTE — ASSESSMENT & PLAN NOTE
Advised laparoscopic cholecystectomy and patient was made aware that all his symptoms may not get better after surgery and risk of a diarrhea explain

## 2022-01-07 NOTE — PROGRESS NOTES
History and Physical Examination - General Surgery   [facility]  Adriana Sumner 49 y o  male MRN: [id number]  Unit/Bed#:  Encounter: [id number] PCP: Jose Holliday DO    History of Present Illness   Chief Complaint:  Right upper quadrant discomfort  GERD and reflux symptoms    HPI:  Adriana Sumner is a 49 y o  male who presents to office with history of reflux and right upper quadrant pain intermittent  Extensive workup with GI and course of Protonix did not improve ultrasound was negative for gallstones and the HIDA scan shows gallbladder function abnormality    Patient was referred to us for possible laparoscopic cholecystectomy  Patient has extensive history of prostatitis and taken Levaquin in past    Patient is not taking any H2 blockers at present  Is fully vaccinated with COVID 19 vaccine  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  Patient had the epigastric and ventral hernia repair with mesh by Dr Annmarie Hanna   The following portions of the patient's history were reviewed and updated as appropriate  Past Medical History:   Diagnosis Date    Abdominal pain     started 4/2020    Bunion     Resolved 5/3/2015     Closed fracture of base of metatarsal bone     Last assessed 5/8/2006     Gastritis     GERD (gastroesophageal reflux disease)     Herpes zoster     Last assessed 8/21/2006     Pes planus, congenital     Resolved 10/9/2017     Plantar fibromatosis     Resolved 10/9/2017     Skin neoplasm     Resolved 2/21/2016     Verruca plana     Resolved 10/9/2017      Past Surgical History:   Procedure Laterality Date    HERNIA REPAIR      SKIN CANCER EXCISION Left     leg    UMBILICAL HERNIA REPAIR      UPPER GASTROINTESTINAL ENDOSCOPY       Social History   Social History     Substance and Sexual Activity   Alcohol Use Yes    Comment: social, 3-4 beers monthly     Social History     Substance and Sexual Activity   Drug Use No     Social History     Tobacco Use Smoking Status Never Smoker   Smokeless Tobacco Never Used     Family History:   Family History   Problem Relation Age of Onset    Diabetes Mother     Gout Mother     Hypertension Mother     Lung cancer Father     Ulcerative colitis Brother        Meds/Allergies   Allergies   Allergen Reactions    Cefadroxil Hives    Cefdinir Swelling    Cortizone-10  [Hydrocortisone]     Penicillins      Reaction Date: [id number];     Sulfamethoxazole-Trimethoprim Rash       Current Outpatient Medications:     cholecalciferol (VITAMIN D3) 400 units tablet, Take 400 Units by mouth daily, Disp: , Rfl:     Multiple Vitamin (MULTI VITAMIN DAILY) TABS, Take by mouth, Disp: , Rfl:     pantoprazole (PROTONIX) 40 mg tablet, Take 1 tablet (40 mg total) by mouth daily (Patient not taking: Reported on 12/2/2021 ), Disp: 90 tablet, Rfl: 2     REVIEW OF SYSTEMS  Constitutional:  Denies fever or chills   Eyes:  Denies change in visual acuity   HENT:  Denies nasal congestion or sore throat   Respiratory:  Denies cough or shortness of breath   Cardiovascular:  Denies chest pain or edema   GI:  Recurrent intermittent abdominal pain, no nausea, vomiting, bloody stools or diarrhea   :  Denies dysuria, frequency, difficulty in micturition and nocturia  Has some burning secondary to chronic prostatitis  Musculoskeletal:  Denies back pain or joint pain   Neurologic:  Denies headache, focal weakness or sensory changes   Endocrine:  Denies polyuria or polydipsia   Lymphatic:  Denies swollen glands   Psychiatric:  Denies depression or anxiety     Objective   Current Vitals:   /76 (BP Location: Right arm, Patient Position: Sitting, Cuff Size: Large)   Pulse 86   Temp (!) 97 4 °F (36 3 °C) (Core)   Ht 5' 7" (1 702 m)   Wt 79 5 kg (175 lb 3 2 oz)   BMI 27 44 kg/m²   Body mass index is 27 44 kg/m²       PHYSICAL EXAMS  General:  Patient is not in acute distress, laying in the bed comfortably, awake, alert responding to commands, HEENT:  Both pupils normal-size atraumatic, normocephalic, nonicteric  Neck:  JVP not raised  Trachea central  Respiratory:  normal Breath sounds clear to auscultation,  Cardiovascular:  S1-S2 normal without any murmur   GI:  Abdomen soft nontender  Liver and spleen normal size, no free fluid, hernial sites unremarkable without any cough impulse scar infraumbilical for umbilical hernia repair and the date epigastric area for small ventral hernia  Musculoskeletal:  No back pain  Integument:  No skin rashes or ulceration  Lymphatic:  No cervical  lymphadenopathy  Neurologic:  Patient is awake alert, responding to command, well-oriented to time and place and person moving all extremities ambulating well    Visit Diagnosis:   Diagnoses and all orders for this visit:    Chronic gastritis without bleeding    Biliary dyskinesia  -     Ambulatory referral to General Surgery    Epigastric pain  -     Ambulatory referral to General Surgery    External hemorrhoids    Gastroesophageal reflux disease with esophagitis without hemorrhage       Plan of care was discussed with patient in detail    Pertinent labs reviewed  Pertinent images and available reads personally reviewed  Procedure: NM hepatobiliary w rx    Result Date: 12/28/2021  Narrative: HEPATOBILIARY SCAN WITH CHOLECYSTOKININ ADMINISTRATION INDICATION: R10 13: Epigastric pain COMPARISON:  Comparison is made to the gallbladder on coronal CT of abdomen dated December 10, 2021 TECHNIQUE:   Following the intravenous administration of 5 mCi Tc-99m labeled mebrofenin, dynamic abdominal images were obtained over a 60 minute time period  Images were performed in AP projection  FINDINGS: There is prompt, uniform accumulation with normal clearance of the radiopharmaceutical by the liver  There is normal filling of the intrahepatic ducts, common bile duct and gallbladder with normal excretion of the radiopharmaceutical into the duodenum    In order to evaluate the contractile response of the gallbladder to  cholecystokinin stimulation, 1 6 mcg sincalide (0 02 mcg/kg) was administered by slow intravenous infusion over 60 minutes  These images demonstrate abnormal contraction of the gallbladder  The calculated gallbladder ejection fraction is 0 % (N = >38%)  The patient experienced no symptoms after CCK administration  Impression: 1  Visualization of the gallbladder without sonographic evidence for acute cholecystitis  2  Abnormal contractile response of the gallbladder to CCK with 0% ejection fraction at 60 minutes  Findings are consistent with chronic cholecystitis/biliary dyskinesia in the appropriate clinical setting  Workstation performed: [id number]     Procedure: CT abdomen pelvis w contrast    Result Date: 12/14/2021  Narrative: CT ABDOMEN AND PELVIS WITH IV CONTRAST INDICATION:   R10 13: Epigastric pain K66 0: Peritoneal adhesions (postprocedural) (postinfection)  Constant abdominal pain  Mesh hernia surgery  ? Adhesion, Epigastric pain, Constant abdominal pain COMPARISON:  7/2/2010 TECHNIQUE:  CT examination of the abdomen and pelvis was performed  Axial, sagittal, and coronal 2D reformatted images were created from the source data and submitted for interpretation  Radiation dose length product (DLP) for this visit:  475 47 mGy-cm   This examination, like all CT scans performed in the [facility], was performed utilizing techniques to minimize radiation dose exposure, including the use of iterative  reconstruction and automated exposure control  IV Contrast:  100 mL of iohexol (OMNIPAQUE) Enteric Contrast:  Enteric contrast was administered  FINDINGS: ABDOMEN LOWER CHEST:  No clinically significant abnormality identified in the visualized lower chest  LIVER/BILIARY TREE:  Unremarkable  GALLBLADDER:  No calcified gallstones  No pericholecystic inflammatory change  SPLEEN:  Unremarkable  PANCREAS:  Unremarkable  ADRENAL GLANDS:  Unremarkable  KIDNEYS/URETERS:  No hydronephrosis or urinary tract calculus  One or more sharply circumscribed subcentimeter renal hypodensities are present, too small to accurately characterize, and statistically most likely benign findings  According to recent literature (Radiology 2019) no further workup of these findings is recommended  STOMACH AND BOWEL:  Unremarkable  APPENDIX:  No findings to suggest appendicitis  ABDOMINOPELVIC CAVITY:  No ascites  No pneumoperitoneum  No lymphadenopathy  VESSELS:  Unremarkable for patient's age  PELVIS REPRODUCTIVE ORGANS:  Unremarkable for patient's age  URINARY BLADDER:  Unremarkable  ABDOMINAL WALL/INGUINAL REGIONS:  Postoperative changes in the anterior abdominal wall with mesh noted in the midline image 33, series 2  No evidence of recurrent residual hernia  OSSEOUS STRUCTURES:  No acute fracture or destructive osseous lesion  Impression: 1  No evidence of bowel obstruction  No evidence of recurrent or residual hernia  Workstation performed: [id number]     Pertinent notes reviewed    Assessment/Plan   Assessment:  Biliary dyskinesia symptomatic    Plan:  Proper consent was obtained  The risks, benefits, alternatives,and probabilities of success were discussed in detail with no guarantee made as to outcome  All questions were answered to the patient's satisfaction  Preoperative prep was explained to the patient  Will repeat the blood work CBC CMP EKG prior to surgery    Counseling / Coordination of Care  Expained patient family in detailed about coordination of care  A description of the counseling / coordination of care:  I performed an interim history, pertinent images and labs, performed a physical examination to arrive at the plan delineated above with associated thought processes  MD Geraldine White    Office   Tel  [phone]  Fax   [phone]

## 2022-02-16 ENCOUNTER — LAB (OUTPATIENT)
Dept: LAB | Facility: HOSPITAL | Age: 52
End: 2022-02-16
Attending: SPECIALIST
Payer: COMMERCIAL

## 2022-02-16 DIAGNOSIS — K21.00 GASTROESOPHAGEAL REFLUX DISEASE WITH ESOPHAGITIS WITHOUT HEMORRHAGE: ICD-10-CM

## 2022-02-16 DIAGNOSIS — R10.13 EPIGASTRIC PAIN: ICD-10-CM

## 2022-02-16 DIAGNOSIS — K29.50 CHRONIC GASTRITIS WITHOUT BLEEDING: ICD-10-CM

## 2022-02-16 DIAGNOSIS — K82.8 BILIARY DYSKINESIA: ICD-10-CM

## 2022-02-16 LAB
ALBUMIN SERPL BCP-MCNC: 4.2 G/DL (ref 3.5–5)
ALP SERPL-CCNC: 70 U/L (ref 46–116)
ALT SERPL W P-5'-P-CCNC: 39 U/L (ref 12–78)
ANION GAP SERPL CALCULATED.3IONS-SCNC: 6 MMOL/L (ref 4–13)
AST SERPL W P-5'-P-CCNC: 25 U/L (ref 5–45)
BILIRUB SERPL-MCNC: 0.74 MG/DL (ref 0.2–1)
BUN SERPL-MCNC: 21 MG/DL (ref 5–25)
CALCIUM SERPL-MCNC: 8.9 MG/DL (ref 8.3–10.1)
CHLORIDE SERPL-SCNC: 100 MMOL/L (ref 100–108)
CO2 SERPL-SCNC: 32 MMOL/L (ref 21–32)
CREAT SERPL-MCNC: 1.03 MG/DL (ref 0.6–1.3)
ERYTHROCYTE [DISTWIDTH] IN BLOOD BY AUTOMATED COUNT: 12.8 % (ref 11.6–15.1)
GFR SERPL CREATININE-BSD FRML MDRD: 84 ML/MIN/1.73SQ M
GLUCOSE SERPL-MCNC: 87 MG/DL (ref 65–140)
HCT VFR BLD AUTO: 47.3 % (ref 36.5–49.3)
HGB BLD-MCNC: 16.1 G/DL (ref 12–17)
MCH RBC QN AUTO: 29.3 PG (ref 26.8–34.3)
MCHC RBC AUTO-ENTMCNC: 34 G/DL (ref 31.4–37.4)
MCV RBC AUTO: 86 FL (ref 82–98)
PLATELET # BLD AUTO: 220 THOUSANDS/UL (ref 149–390)
PMV BLD AUTO: 10.2 FL (ref 8.9–12.7)
POTASSIUM SERPL-SCNC: 4.1 MMOL/L (ref 3.5–5.3)
PROT SERPL-MCNC: 7.7 G/DL (ref 6.4–8.2)
RBC # BLD AUTO: 5.49 MILLION/UL (ref 3.88–5.62)
SODIUM SERPL-SCNC: 138 MMOL/L (ref 136–145)
WBC # BLD AUTO: 7.61 THOUSAND/UL (ref 4.31–10.16)

## 2022-02-16 PROCEDURE — 93005 ELECTROCARDIOGRAM TRACING: CPT

## 2022-02-16 PROCEDURE — 36415 COLL VENOUS BLD VENIPUNCTURE: CPT

## 2022-02-16 PROCEDURE — 85027 COMPLETE CBC AUTOMATED: CPT

## 2022-02-16 PROCEDURE — 80053 COMPREHEN METABOLIC PANEL: CPT

## 2022-02-17 LAB
ATRIAL RATE: 55 BPM
P AXIS: 12 DEGREES
PR INTERVAL: 192 MS
QRS AXIS: 11 DEGREES
QRSD INTERVAL: 82 MS
QT INTERVAL: 430 MS
QTC INTERVAL: 411 MS
T WAVE AXIS: -2 DEGREES
VENTRICULAR RATE: 55 BPM

## 2022-02-17 PROCEDURE — 93010 ELECTROCARDIOGRAM REPORT: CPT | Performed by: INTERNAL MEDICINE

## 2022-02-22 NOTE — PRE-PROCEDURE INSTRUCTIONS
My Surgical Experience    The following information was developed to assist you to prepare for your operation  What do I need to do before coming to the hospital?   Arrange for a responsible person to drive you to and from the hospital    Arrange care for your children at home  Children are not allowed in the recovery areas of the hospital   Plan to wear clothing that is easy to put on and take off  If you are having shoulder surgery, wear a shirt that buttons or zippers in the front  Bathing  o Shower the evening before and the morning of your surgery with an antibacterial soap  Please refer to the Pre Op Showering Instructions for Surgery Patients Sheet   o Remove nail polish and all body piercing jewelry  o Do not shave any body part for at least 24 hours before surgery-this includes face, arms, legs and upper body  Food  o Nothing to eat or drink after midnight the night before your surgery  This includes candy and chewing gum  o Exception: If your surgery is after 12:00pm (noon), you may have clear liquids such as 7-Up®, ginger ale, apple or cranberry juice, Jell-O®, water, or clear broth until 8:00 am  o Do not drink milk or juice with pulp on the morning before surgery  o Do not drink alcohol 24 hours before surgery  Medicine  o Follow instructions you received from your surgeon about which medicines you may take on the day of surgery  o If instructed to take medicine on the morning of surgery, take pills with just a small sip of water  Call your prescribing doctor for specific infroamtion on what to do if you take insulin    What should I bring to the hospital?    Bring:  Davey Nims or a walker, if you have them, for foot or knee surgery   A list of the daily medicines, vitamins, minerals, herbals and nutritional supplements you take   Include the dosages of medicines and the time you take them each day   Glasses, dentures or hearing aids   Minimal clothing; you will be wearing hospital sleepwear   Photo ID; required to verify your identity   If you have a Living Will or Power of , bring a copy of the documents   If you have an ostomy, bring an extra pouch and any supplies you use    Do not bring   Medicines or inhalers   Money, valuables or jewelry    What other information should I know about the day of surgery?  Notify your surgeons if you develop a cold, sore throat, cough, fever, rash or any other illness   Report to the Ambulatory Surgical/Same Day Surgery Unit   You will be instructed to stop at Registration only if you have not been pre-registered   Inform your  fi they do not stay that they will be asked by the staff to leave a phone number where they can be reached   Be available to be reached before surgery  In the event the operating room schedule changes, you may be asked to come in earlier or later than expected    *It is important to tell your doctor and others involved in your health care if you are taking or have been taking any non-prescription drugs, vitamins, minerals, herbals or other nutritional supplements  Any of these may interact with some food or medicines and cause a reaction      Pre-Surgery Instructions:   Medication Instructions    cholecalciferol (VITAMIN D3) 400 units tablet Instructed patient per Anesthesia Guidelines   Multiple Vitamin (MULTI VITAMIN DAILY) TABS Instructed patient per Anesthesia Guidelines

## 2022-02-28 ENCOUNTER — ANESTHESIA EVENT (OUTPATIENT)
Dept: PERIOP | Facility: HOSPITAL | Age: 52
End: 2022-02-28
Payer: COMMERCIAL

## 2022-02-28 NOTE — ANESTHESIA PREPROCEDURE EVALUATION
Procedure:  CHOLECYSTECTOMY LAPAROSCOPIC (N/A Abdomen)    Relevant Problems   CARDIO   (+) Migraine headache      GI/HEPATIC   (+) Gastroesophageal reflux disease with esophagitis without hemorrhage      NEURO/PSYCH   (+) Migraine headache   (+) Personal history of colonic polyps        Physical Exam    Airway    Mallampati score: II  TM Distance: >3 FB  Neck ROM: full     Dental       Cardiovascular  Rhythm: regular, Rate: normal,     Pulmonary  Breath sounds clear to auscultation,     Other Findings        Anesthesia Plan  ASA Score- 2     Anesthesia Type- general with ASA Monitors  Additional Monitors:   Airway Plan: ETT  Plan Factors-    Chart reviewed  Patient is not a current smoker  Induction- intravenous  Postoperative Plan- Plan for postoperative opioid use  Informed Consent- Anesthetic plan and risks discussed with patient  I personally reviewed this patient with the CRNA  Discussed and agreed on the Anesthesia Plan with the CRNA  Katerine Howard

## 2022-03-01 ENCOUNTER — ANESTHESIA (OUTPATIENT)
Dept: PERIOP | Facility: HOSPITAL | Age: 52
End: 2022-03-01
Payer: COMMERCIAL

## 2022-03-01 ENCOUNTER — HOSPITAL ENCOUNTER (OUTPATIENT)
Facility: HOSPITAL | Age: 52
Setting detail: OUTPATIENT SURGERY
Discharge: HOME | End: 2022-03-01
Attending: SPECIALIST | Admitting: SPECIALIST
Payer: COMMERCIAL

## 2022-03-01 VITALS
OXYGEN SATURATION: 100 % | HEART RATE: 58 BPM | SYSTOLIC BLOOD PRESSURE: 129 MMHG | RESPIRATION RATE: 18 BRPM | BODY MASS INDEX: 27.18 KG/M2 | DIASTOLIC BLOOD PRESSURE: 66 MMHG | TEMPERATURE: 37.4 F | HEIGHT: 67 IN | WEIGHT: 173.2 LBS

## 2022-03-01 DIAGNOSIS — K21.00 GASTROESOPHAGEAL REFLUX DISEASE WITH ESOPHAGITIS WITHOUT HEMORRHAGE: ICD-10-CM

## 2022-03-01 DIAGNOSIS — R10.13 EPIGASTRIC PAIN: ICD-10-CM

## 2022-03-01 DIAGNOSIS — K82.8 BILIARY DYSKINESIA: Primary | ICD-10-CM

## 2022-03-01 DIAGNOSIS — K29.50 CHRONIC GASTRITIS WITHOUT BLEEDING: ICD-10-CM

## 2022-03-01 PROCEDURE — NC001 PR NO CHARGE: Performed by: SPECIALIST

## 2022-03-01 PROCEDURE — 47562 LAPAROSCOPIC CHOLECYSTECTOMY: CPT | Performed by: SPECIALIST

## 2022-03-01 PROCEDURE — 88304 TISSUE EXAM BY PATHOLOGIST: CPT | Performed by: PATHOLOGY

## 2022-03-01 PROCEDURE — 47562 LAPAROSCOPIC CHOLECYSTECTOMY: CPT | Performed by: PHYSICIAN ASSISTANT

## 2022-03-01 RX ORDER — ROCURONIUM BROMIDE 10 MG/ML
INJECTION, SOLUTION INTRAVENOUS AS NEEDED
Status: DISCONTINUED | OUTPATIENT
Start: 2022-03-01 | End: 2022-03-01

## 2022-03-01 RX ORDER — PROPOFOL 10 MG/ML
INJECTION, EMULSION INTRAVENOUS AS NEEDED
Status: DISCONTINUED | OUTPATIENT
Start: 2022-03-01 | End: 2022-03-01

## 2022-03-01 RX ORDER — MIDAZOLAM HYDROCHLORIDE 2 MG/2ML
INJECTION, SOLUTION INTRAMUSCULAR; INTRAVENOUS AS NEEDED
Status: DISCONTINUED | OUTPATIENT
Start: 2022-03-01 | End: 2022-03-01

## 2022-03-01 RX ORDER — FENTANYL CITRATE 50 UG/ML
INJECTION, SOLUTION INTRAMUSCULAR; INTRAVENOUS AS NEEDED
Status: DISCONTINUED | OUTPATIENT
Start: 2022-03-01 | End: 2022-03-01

## 2022-03-01 RX ORDER — LIDOCAINE HYDROCHLORIDE 10 MG/ML
INJECTION, SOLUTION EPIDURAL; INFILTRATION; INTRACAUDAL; PERINEURAL AS NEEDED
Status: DISCONTINUED | OUTPATIENT
Start: 2022-03-01 | End: 2022-03-01

## 2022-03-01 RX ORDER — SODIUM CHLORIDE, SODIUM LACTATE, POTASSIUM CHLORIDE, CALCIUM CHLORIDE 600; 310; 30; 20 MG/100ML; MG/100ML; MG/100ML; MG/100ML
75 INJECTION, SOLUTION INTRAVENOUS CONTINUOUS
Status: DISCONTINUED | OUTPATIENT
Start: 2022-03-01 | End: 2022-03-01 | Stop reason: HOSPADM

## 2022-03-01 RX ORDER — EPHEDRINE SULFATE 50 MG/ML
INJECTION INTRAVENOUS AS NEEDED
Status: DISCONTINUED | OUTPATIENT
Start: 2022-03-01 | End: 2022-03-01

## 2022-03-01 RX ORDER — ONDANSETRON 2 MG/ML
INJECTION INTRAMUSCULAR; INTRAVENOUS AS NEEDED
Status: DISCONTINUED | OUTPATIENT
Start: 2022-03-01 | End: 2022-03-01

## 2022-03-01 RX ORDER — METRONIDAZOLE 500 MG/1
500 TABLET ORAL EVERY 8 HOURS SCHEDULED
Status: DISCONTINUED | OUTPATIENT
Start: 2022-03-01 | End: 2022-03-01 | Stop reason: HOSPADM

## 2022-03-01 RX ORDER — FENTANYL CITRATE/PF 50 MCG/ML
25 SYRINGE (ML) INJECTION
Status: DISCONTINUED | OUTPATIENT
Start: 2022-03-01 | End: 2022-03-01 | Stop reason: HOSPADM

## 2022-03-01 RX ORDER — NEOSTIGMINE METHYLSULFATE 1 MG/ML
INJECTION INTRAVENOUS AS NEEDED
Status: DISCONTINUED | OUTPATIENT
Start: 2022-03-01 | End: 2022-03-01

## 2022-03-01 RX ORDER — DEXAMETHASONE SODIUM PHOSPHATE 4 MG/ML
INJECTION, SOLUTION INTRA-ARTICULAR; INTRALESIONAL; INTRAMUSCULAR; INTRAVENOUS; SOFT TISSUE AS NEEDED
Status: DISCONTINUED | OUTPATIENT
Start: 2022-03-01 | End: 2022-03-01

## 2022-03-01 RX ORDER — LEVOFLOXACIN 5 MG/ML
INJECTION, SOLUTION INTRAVENOUS CONTINUOUS PRN
Status: DISCONTINUED | OUTPATIENT
Start: 2022-03-01 | End: 2022-03-01

## 2022-03-01 RX ORDER — OXYCODONE HYDROCHLORIDE AND ACETAMINOPHEN 5; 325 MG/1; MG/1
1 TABLET ORAL ONCE
Status: COMPLETED | OUTPATIENT
Start: 2022-03-01 | End: 2022-03-01

## 2022-03-01 RX ORDER — ONDANSETRON 2 MG/ML
4 INJECTION INTRAMUSCULAR; INTRAVENOUS ONCE AS NEEDED
Status: DISCONTINUED | OUTPATIENT
Start: 2022-03-01 | End: 2022-03-01 | Stop reason: HOSPADM

## 2022-03-01 RX ORDER — BUPIVACAINE HYDROCHLORIDE AND EPINEPHRINE 2.5; 5 MG/ML; UG/ML
INJECTION, SOLUTION INFILTRATION; PERINEURAL AS NEEDED
Status: DISCONTINUED | OUTPATIENT
Start: 2022-03-01 | End: 2022-03-01 | Stop reason: HOSPADM

## 2022-03-01 RX ORDER — LEVOFLOXACIN 5 MG/ML
500 INJECTION, SOLUTION INTRAVENOUS ONCE
Status: DISCONTINUED | OUTPATIENT
Start: 2022-03-01 | End: 2022-03-01 | Stop reason: HOSPADM

## 2022-03-01 RX ORDER — HEPARIN SODIUM 5000 [USP'U]/ML
5000 INJECTION, SOLUTION INTRAVENOUS; SUBCUTANEOUS ONCE
Status: COMPLETED | OUTPATIENT
Start: 2022-03-01 | End: 2022-03-01

## 2022-03-01 RX ORDER — KETOROLAC TROMETHAMINE 30 MG/ML
INJECTION, SOLUTION INTRAMUSCULAR; INTRAVENOUS AS NEEDED
Status: DISCONTINUED | OUTPATIENT
Start: 2022-03-01 | End: 2022-03-01

## 2022-03-01 RX ORDER — OXYCODONE HYDROCHLORIDE AND ACETAMINOPHEN 5; 325 MG/1; MG/1
1 TABLET ORAL EVERY 4 HOURS PRN
Qty: 12 TABLET | Refills: 0 | Status: SHIPPED | OUTPATIENT
Start: 2022-03-01 | End: 2022-03-04

## 2022-03-01 RX ORDER — GLYCOPYRROLATE 0.2 MG/ML
INJECTION INTRAMUSCULAR; INTRAVENOUS AS NEEDED
Status: DISCONTINUED | OUTPATIENT
Start: 2022-03-01 | End: 2022-03-01

## 2022-03-01 RX ADMIN — DEXAMETHASONE SODIUM PHOSPHATE 8 MG: 4 INJECTION, SOLUTION INTRA-ARTICULAR; INTRALESIONAL; INTRAMUSCULAR; INTRAVENOUS; SOFT TISSUE at 07:40

## 2022-03-01 RX ADMIN — GLYCOPYRROLATE 0.2 MG: 0.2 INJECTION, SOLUTION INTRAMUSCULAR; INTRAVENOUS at 08:01

## 2022-03-01 RX ADMIN — ONDANSETRON 4 MG: 2 INJECTION INTRAMUSCULAR; INTRAVENOUS at 07:40

## 2022-03-01 RX ADMIN — SODIUM CHLORIDE, SODIUM LACTATE, POTASSIUM CHLORIDE, AND CALCIUM CHLORIDE 75 ML/HR: .6; .31; .03; .02 INJECTION, SOLUTION INTRAVENOUS at 06:53

## 2022-03-01 RX ADMIN — NEOSTIGMINE METHYLSULFATE 4 MG: 1 INJECTION INTRAVENOUS at 08:47

## 2022-03-01 RX ADMIN — GLYCOPYRROLATE 0.6 MG: 0.2 INJECTION, SOLUTION INTRAMUSCULAR; INTRAVENOUS at 08:47

## 2022-03-01 RX ADMIN — MIDAZOLAM 2 MG: 1 INJECTION INTRAMUSCULAR; INTRAVENOUS at 07:26

## 2022-03-01 RX ADMIN — HEPARIN SODIUM 5000 UNITS: 5000 INJECTION INTRAVENOUS; SUBCUTANEOUS at 06:52

## 2022-03-01 RX ADMIN — FENTANYL CITRATE 100 MCG: 50 INJECTION, SOLUTION INTRAMUSCULAR; INTRAVENOUS at 07:32

## 2022-03-01 RX ADMIN — KETOROLAC TROMETHAMINE 30 MG: 30 INJECTION, SOLUTION INTRAMUSCULAR at 08:31

## 2022-03-01 RX ADMIN — ROCURONIUM BROMIDE 50 MG: 10 INJECTION, SOLUTION INTRAVENOUS at 07:33

## 2022-03-01 RX ADMIN — FENTANYL CITRATE 25 MCG: 50 INJECTION INTRAMUSCULAR; INTRAVENOUS at 09:09

## 2022-03-01 RX ADMIN — LIDOCAINE HYDROCHLORIDE 50 MG: 10 INJECTION, SOLUTION EPIDURAL; INFILTRATION; INTRACAUDAL; PERINEURAL at 07:32

## 2022-03-01 RX ADMIN — ROCURONIUM BROMIDE 20 MG: 10 INJECTION, SOLUTION INTRAVENOUS at 08:15

## 2022-03-01 RX ADMIN — PROPOFOL 200 MG: 10 INJECTION, EMULSION INTRAVENOUS at 07:33

## 2022-03-01 RX ADMIN — OXYCODONE HYDROCHLORIDE AND ACETAMINOPHEN 1 TABLET: 5; 325 TABLET ORAL at 09:22

## 2022-03-01 RX ADMIN — FENTANYL CITRATE 25 MCG: 50 INJECTION INTRAMUSCULAR; INTRAVENOUS at 09:13

## 2022-03-01 RX ADMIN — EPHEDRINE SULFATE 10 MG: 50 INJECTION, SOLUTION INTRAVENOUS at 08:00

## 2022-03-01 RX ADMIN — SODIUM CHLORIDE, SODIUM LACTATE, POTASSIUM CHLORIDE, AND CALCIUM CHLORIDE: .6; .31; .03; .02 INJECTION, SOLUTION INTRAVENOUS at 07:53

## 2022-03-01 RX ADMIN — LEVOFLOXACIN: 5 INJECTION, SOLUTION INTRAVENOUS at 07:28

## 2022-03-01 NOTE — ANESTHESIA POSTPROCEDURE EVALUATION
Post-Op Assessment Note    CV Status:  Stable  Pain Score: 0    Pain management: adequate     Mental Status:  Alert   Hydration Status:  Stable   PONV Controlled:  None   Airway Patency:  Patent   Two or more mitigation strategies used for obstructive sleep apnea   Post Op Vitals Reviewed: Yes      Staff: CRNA         No complications documented      BP   139/75   Temp 97   Pulse 75   Resp 16   SpO2 99

## 2022-03-01 NOTE — PERIOPERATIVE NURSING NOTE
Vitals wdl, patient able to tolerate fluids and void post procedure, dressings clean dry intact, prescriptions sent to pharmacy, discharge education provided to patient and spouse, both stated understanding, left floor via wheelchair by RN, discharged to home

## 2022-03-01 NOTE — H&P
History and Physical Examination - General Surgery   Nehemias Brewster 49 y o  male MRN: [id number]  Unit/Bed#: OR POOL Encounter: [id number] PCP: Sarah Montano DO    History of Present Illness   Chief Complaint:  Upper quadrant discomfort  GERD and reflux symptoms    HPI:  Nehemias Brewster is a 49 y o  male who presents with history of a extensive workup for the right upper quadrant abdominal pain was referred to us for possible laparoscopic cholecystectomy for hyperfunctioning gallbladder on HIDA scan  Patient has previous history of epigastric and umbilical hernia repair with mesh by Dr Eva Elias   Past Medical History:   Diagnosis Date    Abdominal pain     started 4/2020    Bunion     Resolved 5/3/2015     Closed fracture of base of metatarsal bone     Last assessed 5/8/2006     Gastritis     GERD (gastroesophageal reflux disease)     Herpes zoster     Last assessed 8/21/2006     Pes planus, congenital     Resolved 10/9/2017     Plantar fibromatosis     Resolved 10/9/2017     Skin neoplasm     Resolved 2/21/2016     Verruca plana     Resolved 10/9/2017      Past Surgical History:   Procedure Laterality Date    HERNIA REPAIR      SKIN CANCER EXCISION Left     leg    UMBILICAL HERNIA REPAIR      UPPER GASTROINTESTINAL ENDOSCOPY       Social History   Social History     Substance and Sexual Activity   Alcohol Use Yes    Comment: social, 3-4 beers monthly     Social History     Substance and Sexual Activity   Drug Use No     Social History     Tobacco Use   Smoking Status Never Smoker   Smokeless Tobacco Never Used     Family History:   Family History   Problem Relation Age of Onset    Diabetes Mother     Gout Mother     Hypertension Mother     Lung cancer Father     Ulcerative colitis Brother        Meds/Allergies   Allergies   Allergen Reactions    Cefadroxil Hives    Cefdinir Swelling    Cortizone-10 [Hydrocortisone] Other (See Comments)     causes retinal problems    Penicillins      Reaction Date: [id number];     Sulfamethoxazole-Trimethoprim Rash       Current Facility-Administered Medications:     heparin (porcine) 5,000 Units in lactated Ringer's 1,000 mL OR irrigation, , Irrigation, Once, Marialuisa Delgado MD    lactated ringers infusion, 75 mL/hr, Intravenous, Continuous, Anthony James MD, Last Rate: 75 mL/hr at 03/01/22 0653, 75 mL/hr at 03/01/22 0653    levofloxacin (LEVAQUIN) IVPB (premix in dextrose) 500 mg 100 mL, 500 mg, Intravenous, Once **AND** metroNIDAZOLE (FLAGYL) tablet 500 mg, 500 mg, Oral, Q8H [facility], Marialuisa Delgado MD     REVIEW OF SYSTEMS  Constitutional:  Denies fever or chills   Eyes:  Denies change in visual acuity   HENT:  Denies nasal congestion or sore throat   Respiratory:  Denies cough or shortness of breath   Cardiovascular:  Denies chest pain or edema   GI:  Denies abdominal pain, nausea, vomiting, bloody stools or diarrhea   :  Denies dysuria, frequency, difficulty in micturition and nocturia  Musculoskeletal:  Denies back pain or joint pain   Neurologic:  Denies headache, focal weakness or sensory changes   Endocrine:  Denies polyuria or polydipsia   Lymphatic:  Denies swollen glands   Psychiatric:  Denies depression or anxiety     Objective   Current Vitals:   Blood Pressure: 127/75 (03/01/22 0643)  Pulse: 59 (03/01/22 0643)  Temperature: 98 °F (36 7 °C) (03/01/22 0643)  Temp Source: Temporal (03/01/22 0643)  Respirations: 18 (03/01/22 0643)  Height: 5' 7" (170 2 cm) (03/01/22 1017)  Weight - Scale: 78 6 kg (173 lb 3 2 oz) (03/01/22 0643)  SpO2: 98 % (03/01/22 0643)  No intake or output data in the 24 hours ending 03/01/22 0729  Body mass index is 27 13 kg/m²  PHYSICAL EXAMS  General:  Patient is not in acute distress, laying in the bed comfortably, awake, alert responding to commands,   HEENT:  Both pupils normal-size atraumatic, normocephalic, nonicteric  Neck:  JVP not raised   Trachea central  Respiratory:  normal Breath sounds clear to auscultation,  Cardiovascular:  S1-S2 normal without any murmur   GI:  Abdomen soft nontender  Liver and spleen normal size, no free fluid, hernial sites unremarkable without any cough impulse scar from previous surgery  Musculoskeletal:  No back pain  Integument:  No skin rashes or ulceration  Lymphatic:  No cervical or inguinal lymphadenopathy  Neurologic:  Patient is awake alert, responding to command, well-oriented to time and place and person moving all extremities ambulating well    Lab Results: CBC:   Lab Results   Component Value Date    WBC 7 61 02/16/2022    HGB 16 1 02/16/2022    HCT 47 3 02/16/2022    MCV 86 02/16/2022     02/16/2022    MCH 29 3 02/16/2022    MCHC 34 0 02/16/2022    RDW 12 8 02/16/2022    MPV 10 2 02/16/2022   , CMP:   Lab Results   Component Value Date     10/20/2017     02/16/2022     10/13/2021    CO2 32 02/16/2022    CO2 26 10/13/2021    BUN 21 02/16/2022    BUN 17 10/13/2021    CREATININE 1 03 02/16/2022    CREATININE 1 16 10/20/2017    GLUCOSE 78 10/20/2017    CALCIUM 8 9 02/16/2022    CALCIUM 9 4 10/20/2017    AST 25 02/16/2022    AST 20 10/13/2021    ALT 39 02/16/2022    ALT 15 10/13/2021    ALKPHOS 70 02/16/2022    ALKPHOS 52 10/20/2017    PROT 7 0 10/20/2017    BILITOT 0 8 10/20/2017    EGFR 84 02/16/2022     Imaging: No results found     EKG, Pathology, and Other Studies: * No orders in the log *  VTE Pharmacologic Prophylaxis: Heparin  VTE Mechanical Prophylaxis: sequential compression device    Admitting Diagnosis: Biliary dyskinesia [K82 8]  Epigastric pain [R10 13]  Chronic gastritis without bleeding [K29 50]  Gastroesophageal reflux disease with esophagitis without hemorrhage [K21 00]  Assessment/Plan   Code Status: No Order    Assessment:  Hyper function gallbladder  Gallbladder motility disorders with the recurrent right upper quadrant pain  Vaccinated with COVID  Plan:  Care was explained to the patient that all his symptoms may not get improved  For elective cholecystectomy  Labs reviewed  IV antibiotics ordered  Patient is allergic to penicillin and sulfa  Postop instruction discussed in detail  Site was marked    Counseling / Coordination of Care  Total floor / unit time spent today15 minutes  Greater than 50% of total time was spent with the patient and / or family counseling and / or coordination of care  A description of the counseling / coordination of care:  I performed an interim history, pertinent images and labs, performed a physical examination to arrive at the plan delineated above with associated thought processes         Rowan Woo MD [address] [place] [address] Road:  [facility] [address]  Karla Nicole   Office - [phone]  Fax - [phone]    03/01/22  7:29 AM

## 2022-03-01 NOTE — OP NOTE
General SurgeryCHOLECYSTECTOMY LAPAROSCOPIC Op Note    Zarina Savage  3/1/2022    Pre-op Diagnosis:   Biliary dyskinesia [K82 8]  Epigastric pain [R10 13]  Chronic gastritis without bleeding [K29 50]  Gastroesophageal reflux disease with esophagitis without hemorrhage [K21 00]    PMH  Past Medical History:   Diagnosis Date    Abdominal pain     started 4/2020    Bunion     Resolved 5/3/2015     Closed fracture of base of metatarsal bone     Last assessed 5/8/2006     Gastritis     GERD (gastroesophageal reflux disease)     Herpes zoster     Last assessed 8/21/2006     Pes planus, congenital     Resolved 10/9/2017     Plantar fibromatosis     Resolved 10/9/2017     Skin neoplasm     Resolved 2/21/2016     Verruca plana     Resolved 10/9/2017        Post-op Diagnosis:   Patient Active Problem List   Diagnosis    Actinic keratosis    Allergic rhinitis    Central serous retinopathy, left    External hemorrhoids    Migraine headache    Colon's neuroma of right foot    Overweight    Tinea cruris    Healthcare maintenance    [place] cardiac risk <10% in next 10 years    Erectile dysfunction    Prostate cancer screening    Screening for diabetes mellitus (DM)    Screening for cardiovascular, respiratory, and genitourinary diseases    Immunization due    Chronic gastritis without bleeding    Family history of celiac disease    Hiccough    Gastroesophageal reflux disease with esophagitis without hemorrhage    Personal history of colonic polyps    Biliary dyskinesia       Procedure(s):  CHOLECYSTECTOMY LAPAROSCOPIC   Surgeon(s):  DEVAN Estrada MD    Anesthesia:  General    Operative Findings:  Short cystic duct/ slightly kinked secondary to adhesion  Mesh without adhesions from previous the 2 hernia surgery epigastric and umbilical            Assistant:  Ms Imer Braswell was utilized as a first assistant as there is no surgical residency program at [facility]    Staff:   Circulator: Tristin Olivares RN; Jackie Oleary, RN  Scrub Person: Meghan Steve, RN; Tiago Haley, CRISTIANE    Jessica Dillon was identified by me  Proper consent was obtained  The risks, benefits, alternatives,and probabilities of success were discussed in detail with no guarantee made as to outcome  All questions were answered to the patient's satisfaction  Site was marked prior coming to OR    Operative site was cleansed with ChloraPrep and draped in usual sterile fashion  Jessica Dillon was given pre-operative antibiotics  Body mass index is 27 13 kg/m²  Jessica Dillon is ASA 2 and Fire risk- 3  Jessica Dillon was re-identified in the OR  Site was identified and detailed timeout was performed with Anesthesia and OR staff  Subcostal transverse subxiphoid incision was made in view of patient's shunt had previous the periumbilical and the epigastric the surgery for large hernia repair with mesh  The skin and subcutaneous tissue was cut along the line of incision  With open Ruth technique a 10mm port was placed  Pneumoperitoneum was created with the pressure marked up to 15 mmHg and maintained during the procedure with high flow carbon dioxide  Gallbladder was identified  Three other ports were placed, one in the right side of the umbilicus away from the previous mesh which was visualized from inside 10mm subcostal, one 5mm midclavicular subcostal and other one 5mm ant axillary subcostal  Gallbladder was grasped with a grasper introduced from the lateralmost 5 mm port at the fundus and was retracted upward laterally  Another grasper was introduced from the midclavicular port 5 mm and Ahuja's pouch of the gallbladder was grasped and was retracted downward and laterally   Patient has a kink short cystic duct due to adhesion  The infundibulum of the gallbladder and cystic duct were clearly identified as well as the cystic artery    After obtaining the critical view, Cystic duct was doubly clipped and cut between the clips  Cystic artery was doubly clipped and cut between the clips  Gallbladder was then removed from the gallbladder bed with Bovie dissection  Proper hemostasis was achieved  No active bleeding was noted  The gallbladder was then placed in the Endo pouch and was delivered through the infraumbilical transverse incision  Small bile leak was noted from the gallbladder side of the cystic duct clipped this was sucked out an area 3 lavaged copious amounts of fluid for irrigation clear for a fluid was drained before completion of the procedure    All the 10 mm port sites were closed in double layers with figure of 8-0 Vicryl, 2-0 for the sheath and the skin was approximated with subcuticular Monocryl  Remaining all 5 mm ports were stitch with a single layer with subcuticular Monocryl skin approximation All the ports site were thoroughly infiltrated with Marcaine with Epinephrine  Sterile dressing was applied to all port site incisions    Ganesh Mariscal tolerated the procedure well, remain stable during and after the procedure  At the end of the procedure all the instruments, needle and sponge counts were noted to be correct  Sterile dresing was applied and patient was transfered to recovery area in stable condition  Estimated Blood Loss:  5 mL    Specimens:  Order Name Source Comment Collection Info Order Time   TISSUE EXAM Gallbladder  Collected By: Elizabeth Reynolds MD 3/1/2022  8:26 AM     Release to patient through Mychart   Immediate              Drains:  * No LDAs found *    Complications:  None    Total OR Time  * Missing case tracking time(s) *   or    I was present for the entire procedure No qualified resident was available  A physician's assistant was required due to the intensity of the surgery  Physician assistant was required for camera operation, hemostasis retraction and the closure of the surgical wound   Physician assistant was present during the entire procedure      Patient Disposition:  PACU       Zara Sultana MD MS FRCS FACS  [address]  Date: 3/1/2022  Time: 8:49 AM  Copy to PCP: Savage Suh DO

## 2022-03-01 NOTE — PERIOPERATIVE NURSING NOTE
Patient received into [facility] via stretcher from  OR, patient reactive, c/o pain 3/10  Abdominal dressings x 4 gauze and tegaderm clean, dry and intact

## 2022-03-01 NOTE — DISCHARGE INSTRUCTIONS
Biliary Dyskinesia   AMBULATORY CARE:   Biliary dyskinesia  is a condition that causes pain in your gallbladder (in your upper right abdomen)  The gallbladder stores bile made by the liver  Bile is used to help break down fat in the food you eat  The gallbladder has a valve called a sphincter that prevents bile from flowing out of the gallbladder until it is needed  The bile moves through a duct and into the small intestine  If the sphincter is scarred or has spasms, bile cannot flow out of the gallbladder  The bile then flows back into the gallbladder and causes pain  Common signs and symptoms of biliary dyskinesia:   · Pain in your upper right abdomen that lasts at least 30 minutes at a time, and comes and goes    · Severe pain that keeps you from doing your daily activities or wakes you from sleep    · Pain after you eat that continues even after you have a bowel movement or change position    · Jaundice    · Nausea, vomiting, or bloating    · Weight loss without trying, or loss of appetite    Seek care immediately if:   · You have a fever and chills  · Your eyes or skin turn yellow  Contact your healthcare provider if:   · Your urine is dark  · Your pain does not get better with pain medicine  · You have chanelle-colored bowel movements  · You have new or worsening symptoms  · You have questions or concerns about your condition or care  Treatment:  Your symptoms may go away without treatment  You may need any of the following if your symptoms are severe or continue:  · Prescription pain medicine  may be given  Ask your healthcare provider how to take this medicine safely  Some prescription pain medicines contain acetaminophen  Do not take other medicines that contain acetaminophen without talking to your healthcare provider  Too much acetaminophen may cause liver damage  Prescription pain medicine may cause constipation  Ask your healthcare provider how to prevent or treat constipation  · NSAIDs , such as ibuprofen, help decrease swelling, pain, and fever  This medicine is available with or without a doctor's order  NSAIDs can cause stomach bleeding or kidney problems in certain people  If you take blood thinner medicine, always ask if NSAIDs are safe for you  Always read the medicine label and follow directions  Do not give these medicines to children under 10months of age without direction from your child's healthcare provider  · Surgery  may be used to remove your gallbladder  Gallbladder surgery is usually not done on young children  Manage biliary dyskinesia:   · Maintain a healthy weight  Extra body weight can increase your risk for gallbladder problems, and can make your pain worse  Try not to gain or lose a large amount of weight quickly  This can also increase or worsen your risk for gallbladder problems  Ask your healthcare provider to help you create a healthy weight loss plan if you are overweight  · Eat a variety of healthy foods  Healthy foods include fruits, vegetables, low-fat dairy products, lean meats, fish, and cooked beans  Ask if you need to be on a special diet  Your healthcare provider may recommend a low-fat diet  Choose healthy fats, such as olive oil, canola oil, avocado, and nuts  Omega 3 fats are also healthy  Omega 3 fats are in fish, such as salmon, trout, and tuna  They are also in plant foods such as flaxseed, walnuts, and soybeans  You may also need to avoid any foods that trigger your symptoms  Follow up with your healthcare provider as directed:  Write down your questions so you remember to ask them during your visits  © [employer] 2022 Information is for End User's use only and may not be sold, redistributed or otherwise used for commercial purposes  All illustrations and images included in CareNotes® are the copyrighted property of [employer]  or Orlin Ruiz   The above information is an  only   It is not intended as medical advice for individual conditions or treatments  Talk to your doctor, nurse or pharmacist before following any medical regimen to see if it is safe and effective for you  Please follow carefully all instructions checked below  Chris Kelly  Pre-op Diagnosis:   Biliary dyskinesia [K82 8]  Epigastric pain [R10 13]  Chronic gastritis without bleeding [K29 50]  Gastroesophageal reflux disease with esophagitis without hemorrhage [K21 00]  Post-op Diagnosis:  Post-Op Diagnosis Codes:     * Biliary dyskinesia [K82 8]     * Epigastric pain [R10 13]     * Chronic gastritis without bleeding [K29 50]     * Gastroesophageal reflux disease with esophagitis without hemorrhage [K21 00]  Procedure(s):  CHOLECYSTECTOMY LAPAROSCOPIC  Surgeon(s):  DEVAN Valenzuela MD    1  Diet:  · Resume your normal diet  Avoid red meat eat lots of Plain natural yogurt or Probiotics   2  Medications:  · Home medications reviewed  Resume pre-operative medications unless noted below  · Prescription sent home with patient and Prescription sent over to pharmacy  · See after visit summary for reconciled discharge medications provided to patient and family  · Apply ice to incision area this will numb that area and it will prevent bruising and will minimize pain   3  Activities:  · Do NOT make important personal or business decisions for 24 hours  · Do NOT take the anticoagulation medicine like Eliquis Xarelto Coumadin for 24 hours after surgery  · Do NOT drive or operate machinery for 7 days  · While taking pain medication, do not drive and be careful as you walk or climb stairs  · Limit your activities for 3 weeks  Do not engage in sports, heavy work or heavy  lifting until your physician gives you permission  4  Wound Care:  · Change dressings as necessary after 2 days  · Keep dressings dry until then  5  Special Instructions:  · Full weight bearing  6  Bathing:  · May shower after 2 days    7  When to Call:  Call if fever, Nausea, vomiting, Constipation not feeling well, or wound infection, bleeding,reddness and excessive pain,  8  Follow-up Care:  · Make an appointment to see MD Clint Woods FACS  in 10 days for Follow up  Please Call Office   for appointment,       Kendra Whitaker MD [address] [facility]:  [address]  [place]  Office - [phone]  Fax - [phone]  01/03/18  2:24 PM  Laparoscopic Cholecystectomy   AMBULATORY CARE:   What you need to know about a laparoscopic cholecystectomy:  Laparoscopic cholecystectomy is surgery to remove gallstones and your gallbladder  How to prepare for surgery: Your surgeon will tell you how to prepare  You may be told not to eat or drink anything after midnight on the day of surgery  Arrange to have someone drive you home after surgery and stay with you for 24 hours  Tell your surgeon all the medicines you currently take  He or she will tell you if you need to stop any medicine for surgery, and when to stop  He or she will tell you which medicines to take or not take on the day of surgery  You may need blood or urine tests  You may also need x-rays, an ultrasound, or a CT scan  Tell your surgeon if you had an allergic reaction to contrast liquid  Tell your surgeon about any allergies you have, including medicines and anesthesia  What will happen during surgery: Your surgeon will make between 1 and 4 small incisions in your abdomen or belly button  He or she will insert small tools into the incisions  Your abdomen will be filled with carbon dioxide gas to make it swell  This helps your surgeon see your organs better and gives more room to move the tools around  Your surgeon will look for and remove gallstones in and around your gallbladder  X-rays or an ultrasound may be used   Your surgeon will remove your gallbladder through one of the incisions  The carbon dioxide will be released from your abdomen  The incisions will be closed with stitches, medical glue, or adhesive strips, then covered with bandages  What to expect after surgery: You will be taken to a recovery room until you are fully awake  Healthcare providers will monitor you closely for any problems  Providers will help you walk around to prevent blood clots  You may be able to go home later the same day, or you may stay in the hospital overnight  Pain, a sore throat, nausea, and vomiting are common after this surgery  These should get better within a few days  You may also have diarrhea that lasts up to a few months  Medicines may be given to prevent or treat pain, nausea, and vomiting  Medicines may also be given to prevent a bacterial infection  Blood thinners may be given to prevent blood clots  You may be bleed or bruise more easily while you are taking blood thinners  Your surgeon will tell you when to remove the bandages covering the surgery area  He or she will tell you when it is okay to start bathing  You may need to take showers instead of baths for a few days  Your surgeon will tell you when you can drive, return to work, and do your regular daily activities  You will be shown how to care for the surgery area and check for signs of infection  You will also be told which foods to eat in the days and weeks after surgery  Risks of a laparoscopic cholecystectomy:   You could bleed more than expected or get an infection  Any carbon dioxide gas still in your body can cause neck and shoulder pain  Your gallbladder may leak bile into your abdomen during or after surgery  This can cause a severe infection or an abscess  You may still have gallstones after surgery  You may need a different procedure to remove them  Your surgeon may need to make a larger incision than expected during surgery   Your bile duct, bowel, or other organs could be damaged during surgery  This can be life-threatening  Call your local emergency number (911 in the [address]) if:   You feel lightheaded, short of breath, and have chest pain  You cough up blood  Seek care immediately if:   Your arm or leg feels warm, tender, and painful  It may look swollen and red  You cannot stop vomiting  Your bowel movements are black or bloody  You have pain in your abdomen and it is swollen or hard  You have a fever over 101°F (38°C) or chills  Call your doctor or surgeon if:   You have pain or nausea that is not relieved by medicine  You have redness and swelling around your incision sites  You have blood or pus leaking from your incision sites  You are constipated, have diarrhea, or your bowel movements are pale  Your skin or eyes are yellow  You have questions or concerns about your surgery, condition, or care  Medicines: You may need any of the following:  Prescription pain medicine  may be given  Ask your healthcare provider how to take this medicine safely  Some prescription pain medicines contain acetaminophen  Do not take other medicines that contain acetaminophen without talking to your healthcare provider  Too much acetaminophen may cause liver damage  Prescription pain medicine may cause constipation  Ask your healthcare provider how to prevent or treat constipation  NSAIDs  help decrease swelling and pain or fever  This medicine is available with or without a doctor's order  NSAIDs can cause stomach bleeding or kidney problems in certain people  If you take blood thinner medicine, always ask your healthcare provider if NSAIDs are safe for you  Always read the medicine label and follow directions  Take your medicine as directed  Contact your healthcare provider if you think your medicine is not helping or if you have side effects  Tell him or her if you are allergic to any medicine  Keep a list of the medicines, vitamins, and herbs you take   Include the amounts, and when and why you take them  Bring the list or the pill bottles to follow-up visits  Carry your medicine list with you in case of an emergency  Take deep breaths and cough 10 times each hour: This will decrease your risk for a lung infection  Take a deep breath and hold it for as long as you can  Then let the air out and cough strongly  You may be given an incentive spirometer to help you take deep breaths  Put the plastic piece in your mouth and take a slow, deep breath  Then let the air out and cough  Repeat these steps 10 times every hour  Care for the surgery area:   Remove the bandages as directed  Your surgeon may tell you to remove the bandages the day after surgery  Keep the area clean and dry  You may take a shower the day after your surgery  Do not take baths, swim, or soak in a hot tub until your surgeon says it is okay  Check for signs of infection each day  Check the area for swelling, red streaks, or pus  Tell your surgeon right away if you see any of these  Hug a pillow against the surgery area before you sneeze or cough  This will help prevent pain and protect the surgery area  What to eat after surgery:   Eat low-fat foods for 4 to 6 weeks  while your body learns to digest fat without a gallbladder  Slowly increase the amount of fat that you eat  Drink more liquids  Ask how much liquid to drink and which liquids are best for you  When to return to work and other activities:   Rest often  and slowly increase your activity level each day  If an activity causes pain, wait several days before you do that activity again  Do not drive  for the first 24 hours after surgery  Your surgeon will tell you when it is okay to drive after the first 24 hours  This is usually after you have stopped taking narcotic pain medicine for a few days  Do not lift anything heavier than 10 pounds  for 4 to 6 weeks, or as directed      You may return to work or other activities as soon as your pain is controlled and you feel comfortable  This is usually 5 to 7 days after surgery  Follow up with your doctor or surgeon as directed:  Write down your questions so you remember to ask them during your visits  © Copyright [employer] 2022 Information is for End User's use only and may not be sold, redistributed or otherwise used for commercial purposes  All illustrations and images included in CareNotes® are the copyrighted property of [employer]  or Orlin Ruiz   The above information is an  only  It is not intended as medical advice for individual conditions or treatments  Talk to your doctor, nurse or pharmacist before following any medical regimen to see if it is safe and effective for you

## 2022-03-18 ENCOUNTER — TELEPHONE (OUTPATIENT)
Dept: SURGERY | Facility: CLINIC | Age: 52
End: 2022-03-18

## 2022-03-18 ENCOUNTER — OFFICE VISIT (OUTPATIENT)
Dept: SURGERY | Facility: CLINIC | Age: 52
End: 2022-03-18

## 2022-03-18 VITALS — BODY MASS INDEX: 28.06 KG/M2 | TEMPERATURE: 97.6 F | WEIGHT: 178.8 LBS | HEIGHT: 67 IN

## 2022-03-18 DIAGNOSIS — Z09 SURGICAL FOLLOW-UP CARE: ICD-10-CM

## 2022-03-18 DIAGNOSIS — K82.8 BILIARY DYSKINESIA: Primary | ICD-10-CM

## 2022-03-18 PROCEDURE — 99024 POSTOP FOLLOW-UP VISIT: CPT | Performed by: SPECIALIST

## 2022-03-18 NOTE — PROGRESS NOTES
General Surgery Office Visit Follow up   [facility]  Patient: Alan Daniels   : 1970 Sex: male MRN: [id number]   CSN: [id number] PCP: Raúl Monique DO    Assessment/ Plan:  Alan Daniels is a 49 y o  male  day(s) POD # 2 weeks  S/p laparoscopic cholecystic  Biliary dyskinesia [K82 8]    Plan  Stable postop   Patient has pain and bloatedness is markedly improved patient can eat now spicy food and greasy food  Discharge from follow-up     Local dressing for port site    SUBJECTIVE:   I am doing very well I am your poster patient for success    OBJECTIVE:  No complaints  Minimal incisional pain  And drainage in subcu xiphoid incision  No fever no chills no rigors  Tolerating p o  Diet well  Normal bowel movement no constipation or diarrhea  Ambulating well   Vitals:   Temp 97 6 °F (36 4 °C) (Core)   Ht 5' 7" (1 702 m)   Wt 81 1 kg (178 lb 12 8 oz)   BMI 28 00 kg/m²     Active medications:    Current Outpatient Medications:     cholecalciferol (VITAMIN D3) 400 units tablet, Take 400 Units by mouth daily, Disp: , Rfl:     Multiple Vitamin (MULTI VITAMIN DAILY) TABS, Take by mouth, Disp: , Rfl:     pantoprazole (PROTONIX) 40 mg tablet, Take 1 tablet (40 mg total) by mouth daily (Patient not taking: Reported on 3/18/2022 ), Disp: 90 tablet, Rfl: 2    Physical Exam:   General Alert awake   Normocephalic atraumatic PERRLA   Lungs clear bilaterally  Cardiac normal S1 normal S2  Abdomen soft,non tender Bowel sounds present  Skin: surgical dressing is C/D/I  Ext: No clubbing, cyanosis, edema  Surgical wound well healed except the subxiphoid port site continue dressing and keep it dry    Visit Diagnosis:   Diagnoses and all orders for this visit:    Biliary dyskinesia    Surgical follow-up care       Plan of care was discussed with patient in detail    Pertinent labs reviewed  Most Recent Labs:   No visits with results within 2 Week(s) from this visit     Latest known visit with results is:   Admission on 03/01/2022, Discharged on 03/01/2022   Component Date Value Ref Range Status    Case Report 03/01/2022    Final                    Value:Surgical Pathology Report                         Case: [id number]                                   Authorizing Provider:  Rowan Woo MD          Collected:           03/01/2022 0751              Ordering Location:     [address] [place] [address] Received:            03/01/2022 2008                                     Operating Room                                                               Pathologist:           Naomy Mendez MD                                                         Specimen:    Gallbladder                                                                                Final Diagnosis 03/01/2022    Final                    Value: This result contains rich text formatting which cannot be displayed here   Additional Information 03/01/2022    Final                    Value: This result contains rich text formatting which cannot be displayed here  Ivan Meth Description 03/01/2022    Final                    Value: This result contains rich text formatting which cannot be displayed here  Pertinent images and available reads personally reviewed  No results found  Pertinent notes reviewed  Final Diagnosis   A   Gallbladder, cholecystectomy:     - Chronic cholecystitis with Rokitansky-Aschoff sinuses  Counseling / Coordination of Care  Total Office time /unit time spent today 15minutes  Greater than 50% of total time was spent with the patient and / or family counseling and / or coordination of care  A description of the counseling / coordination of care:  I performed an interim history, pertinent images and labs, performed a physical examination to arrive at the plan delineated above with associated thought processes         Rowan Woo MD MS FRCS FACS  [facility]  03/18/22 8:36 AM        Portions of the record may have been created with voice recognition software  Occasional wrong word or "sound a like" substitutions may have occurred due to the inherent limitations of voice recognition software  Read the chart carefully and recognize, using context, where substitutions have occurred

## 2022-03-18 NOTE — TELEPHONE ENCOUNTER
Patient called was scheduling MRI of Foot    Had previous hernia surgery with Dr Fanta Lucio, and has question about mesh material   Message sent to Dr Lara Failing to call patient

## 2022-08-08 ENCOUNTER — OFFICE VISIT (OUTPATIENT)
Dept: FAMILY MEDICINE CLINIC | Facility: CLINIC | Age: 52
End: 2022-08-08
Payer: COMMERCIAL

## 2022-08-08 VITALS
HEIGHT: 67 IN | WEIGHT: 177 LBS | SYSTOLIC BLOOD PRESSURE: 124 MMHG | BODY MASS INDEX: 27.78 KG/M2 | RESPIRATION RATE: 18 BRPM | OXYGEN SATURATION: 98 % | TEMPERATURE: 99.3 F | DIASTOLIC BLOOD PRESSURE: 86 MMHG | HEART RATE: 68 BPM

## 2022-08-08 DIAGNOSIS — Z90.49 S/P LAPAROSCOPIC CHOLECYSTECTOMY: ICD-10-CM

## 2022-08-08 DIAGNOSIS — R76.8 POSITIVE COOMBS TEST: Primary | ICD-10-CM

## 2022-08-08 DIAGNOSIS — R06.81 WITNESSED EPISODE OF APNEA: ICD-10-CM

## 2022-08-08 DIAGNOSIS — R79.9 ABNORMAL BLOOD CHEMISTRY TEST: ICD-10-CM

## 2022-08-08 PROBLEM — R06.6 HICCOUGH: Status: RESOLVED | Noted: 2021-01-14 | Resolved: 2022-08-08

## 2022-08-08 PROCEDURE — 3725F SCREEN DEPRESSION PERFORMED: CPT | Performed by: FAMILY MEDICINE

## 2022-08-08 PROCEDURE — 99214 OFFICE O/P EST MOD 30 MIN: CPT | Performed by: FAMILY MEDICINE

## 2022-08-08 RX ORDER — TADALAFIL 5 MG/1
TABLET ORAL
COMMUNITY
Start: 2022-05-27 | End: 2022-12-25

## 2022-08-08 NOTE — PROGRESS NOTES
Assessment/Plan:    No problem-specific Assessment & Plan notes found for this encounter  Repeat test  R/o autoimmune or infection causes  Offer hematology eval pending results    Sleep study referral for witnessed apneas     Diagnoses and all orders for this visit:    Positive Mack test  -     CBC and differential; Future  -     WINDY Screen w/ Reflex to Titer/Pattern; Future  -     C-reactive protein; Future  -     Uric acid; Future  -     LD,Blood; Future  -     EBV acute panel; Future  -     Direct antiglobulin test; Future  -     Ambulatory referral to Hematology / Oncology; Future    Abnormal blood chemistry test  -     CBC and differential; Future  -     WINDY Screen w/ Reflex to Titer/Pattern; Future  -     C-reactive protein; Future  -     Uric acid; Future  -     LD,Blood; Future  -     EBV acute panel; Future  -     Direct antiglobulin test; Future  -     Ambulatory referral to Hematology / Oncology; Future    Witnessed episode of apnea  -     Ambulatory referral to Sleep Medicine; Future    S/P laparoscopic cholecystectomy              Return if symptoms worsen or fail to improve  Subjective:      Patient ID: Sadie Chan is a 47 y o  male      Chief Complaint   Patient presents with    blood work     SHONA           sas/cma       HPI    O neg blood  Donates regularly typically  SHONA pos reported  Not anemic  Donated, never received a transfusion  Recent cholecystectomy w/o transfusion    No unusual wt loss  No fevers  No foreign travel  No lymphadenopathy  No fam hx of lupus or leukemias      The following portions of the patient's history were reviewed and updated as appropriate: allergies, current medications, past family history, past medical history, past social history, past surgical history and problem list     Review of Systems      Current Outpatient Medications   Medication Sig Dispense Refill    cholecalciferol (VITAMIN D3) 400 units tablet Take 400 Units by mouth daily      Multiple Vitamin (MULTI VITAMIN DAILY) TABS Take by mouth      tadalafil (CIALIS) 5 MG tablet        No current facility-administered medications for this visit         Objective:    /86   Pulse 68   Temp 99 3 °F (37 4 °C)   Resp 18   Ht 5' 7" (1 702 m)   Wt 80 3 kg (177 lb)   SpO2 98%   BMI 27 72 kg/m²        Physical Exam           Vermonique Can, DO

## 2022-08-11 ENCOUNTER — TELEPHONE (OUTPATIENT)
Dept: HEMATOLOGY ONCOLOGY | Facility: CLINIC | Age: 52
End: 2022-08-11

## 2022-08-13 LAB
ANA SER QL: NEGATIVE
BASOPHILS # BLD AUTO: 0 X10E3/UL (ref 0–0.2)
BASOPHILS NFR BLD AUTO: 0 %
CRP SERPL-MCNC: <1 MG/L (ref 0–10)
DAT POLY-SP REAG RBC QL: POSITIVE
EBV NA IGG SER IA-ACNC: <18 U/ML (ref 0–17.9)
EBV VCA IGG SER IA-ACNC: 125 U/ML (ref 0–17.9)
EBV VCA IGM SER IA-ACNC: <36 U/ML (ref 0–35.9)
EOSINOPHIL # BLD AUTO: 0.2 X10E3/UL (ref 0–0.4)
EOSINOPHIL NFR BLD AUTO: 2 %
ERYTHROCYTE [DISTWIDTH] IN BLOOD BY AUTOMATED COUNT: 12.8 % (ref 11.6–15.4)
HCT VFR BLD AUTO: 45 % (ref 37.5–51)
HGB BLD-MCNC: 15.3 G/DL (ref 13–17.7)
IMM GRANULOCYTES # BLD: 0 X10E3/UL (ref 0–0.1)
IMM GRANULOCYTES NFR BLD: 0 %
INTERPRETATION: ABNORMAL
LDH SERPL-CCNC: 154 IU/L (ref 121–224)
LYMPHOCYTES # BLD AUTO: 2 X10E3/UL (ref 0.7–3.1)
LYMPHOCYTES NFR BLD AUTO: 27 %
Lab: NEGATIVE
Lab: POSITIVE
MCH RBC QN AUTO: 29.8 PG (ref 26.6–33)
MCHC RBC AUTO-ENTMCNC: 34 G/DL (ref 31.5–35.7)
MCV RBC AUTO: 88 FL (ref 79–97)
MONOCYTES # BLD AUTO: 0.5 X10E3/UL (ref 0.1–0.9)
MONOCYTES NFR BLD AUTO: 7 %
NEUTROPHILS # BLD AUTO: 4.6 X10E3/UL (ref 1.4–7)
NEUTROPHILS NFR BLD AUTO: 64 %
PLATELET # BLD AUTO: 222 X10E3/UL (ref 150–450)
RBC # BLD AUTO: 5.13 X10E6/UL (ref 4.14–5.8)
URATE SERPL-MCNC: 5.1 MG/DL (ref 3.8–8.4)
WBC # BLD AUTO: 7.3 X10E3/UL (ref 3.4–10.8)

## 2022-09-30 ENCOUNTER — CONSULT (OUTPATIENT)
Dept: HEMATOLOGY ONCOLOGY | Facility: CLINIC | Age: 52
End: 2022-09-30
Payer: COMMERCIAL

## 2022-09-30 ENCOUNTER — APPOINTMENT (OUTPATIENT)
Dept: LAB | Facility: CLINIC | Age: 52
End: 2022-09-30
Payer: COMMERCIAL

## 2022-09-30 VITALS
WEIGHT: 175 LBS | OXYGEN SATURATION: 98 % | HEIGHT: 67 IN | DIASTOLIC BLOOD PRESSURE: 76 MMHG | HEART RATE: 63 BPM | BODY MASS INDEX: 27.47 KG/M2 | RESPIRATION RATE: 18 BRPM | TEMPERATURE: 99 F | SYSTOLIC BLOOD PRESSURE: 126 MMHG

## 2022-09-30 DIAGNOSIS — R76.8 POSITIVE COOMBS TEST: ICD-10-CM

## 2022-09-30 LAB
ALBUMIN SERPL BCP-MCNC: 4.6 G/DL (ref 3.5–5)
ALP SERPL-CCNC: 56 U/L (ref 34–104)
ALT SERPL W P-5'-P-CCNC: 16 U/L (ref 7–52)
ANION GAP SERPL CALCULATED.3IONS-SCNC: 4 MMOL/L (ref 4–13)
AST SERPL W P-5'-P-CCNC: 19 U/L (ref 13–39)
BASOPHILS # BLD AUTO: 0.03 THOUSANDS/ΜL (ref 0–0.1)
BASOPHILS NFR BLD AUTO: 1 % (ref 0–1)
BILIRUB DIRECT SERPL-MCNC: 0.11 MG/DL (ref 0–0.2)
BILIRUB SERPL-MCNC: 0.99 MG/DL (ref 0.2–1)
BLD SMEAR INTERP: NORMAL
BUN SERPL-MCNC: 20 MG/DL (ref 5–25)
CALCIUM SERPL-MCNC: 9.4 MG/DL (ref 8.4–10.2)
CHLORIDE SERPL-SCNC: 101 MMOL/L (ref 96–108)
CO2 SERPL-SCNC: 33 MMOL/L (ref 21–32)
CREAT SERPL-MCNC: 1.08 MG/DL (ref 0.6–1.3)
DAT POLY-SP REAG RBC QL: NEGATIVE
EOSINOPHIL # BLD AUTO: 0.15 THOUSAND/ΜL (ref 0–0.61)
EOSINOPHIL NFR BLD AUTO: 3 % (ref 0–6)
ERYTHROCYTE [DISTWIDTH] IN BLOOD BY AUTOMATED COUNT: 12.9 % (ref 11.6–15.1)
GFR SERPL CREATININE-BSD FRML MDRD: 79 ML/MIN/1.73SQ M
GLUCOSE SERPL-MCNC: 90 MG/DL (ref 65–140)
HCT VFR BLD AUTO: 46.3 % (ref 36.5–49.3)
HGB BLD-MCNC: 15.5 G/DL (ref 12–17)
IMM GRANULOCYTES # BLD AUTO: 0.01 THOUSAND/UL (ref 0–0.2)
IMM GRANULOCYTES NFR BLD AUTO: 0 % (ref 0–2)
LDH SERPL-CCNC: 147 U/L (ref 140–271)
LYMPHOCYTES # BLD AUTO: 1.86 THOUSANDS/ΜL (ref 0.6–4.47)
LYMPHOCYTES NFR BLD AUTO: 32 % (ref 14–44)
MCH RBC QN AUTO: 28.8 PG (ref 26.8–34.3)
MCHC RBC AUTO-ENTMCNC: 33.5 G/DL (ref 31.4–37.4)
MCV RBC AUTO: 86 FL (ref 82–98)
MONOCYTES # BLD AUTO: 0.54 THOUSAND/ΜL (ref 0.17–1.22)
MONOCYTES NFR BLD AUTO: 9 % (ref 4–12)
NEUTROPHILS # BLD AUTO: 3.25 THOUSANDS/ΜL (ref 1.85–7.62)
NEUTS SEG NFR BLD AUTO: 55 % (ref 43–75)
NRBC BLD AUTO-RTO: 0 /100 WBCS
PLATELET # BLD AUTO: 225 THOUSANDS/UL (ref 149–390)
PMV BLD AUTO: 9.7 FL (ref 8.9–12.7)
POTASSIUM SERPL-SCNC: 4.3 MMOL/L (ref 3.5–5.3)
PROT SERPL-MCNC: 7.3 G/DL (ref 6.4–8.4)
RBC # BLD AUTO: 5.38 MILLION/UL (ref 3.88–5.62)
SODIUM SERPL-SCNC: 138 MMOL/L (ref 135–147)
WBC # BLD AUTO: 5.84 THOUSAND/UL (ref 4.31–10.16)

## 2022-09-30 PROCEDURE — 99204 OFFICE O/P NEW MOD 45 MIN: CPT

## 2022-09-30 PROCEDURE — 85025 COMPLETE CBC W/AUTO DIFF WBC: CPT

## 2022-09-30 PROCEDURE — 83615 LACTATE (LD) (LDH) ENZYME: CPT

## 2022-09-30 PROCEDURE — 82248 BILIRUBIN DIRECT: CPT

## 2022-09-30 PROCEDURE — 83010 ASSAY OF HAPTOGLOBIN QUANT: CPT

## 2022-09-30 PROCEDURE — 36415 COLL VENOUS BLD VENIPUNCTURE: CPT

## 2022-09-30 PROCEDURE — 86880 COOMBS TEST DIRECT: CPT

## 2022-09-30 PROCEDURE — 80053 COMPREHEN METABOLIC PANEL: CPT

## 2022-09-30 NOTE — PROGRESS NOTES
[facility] [address] [address]  Hematology Ambulatory Consult  Ruben Cano, 1970, [id number]  9/30/2022    Assessment/Plan:  1  Positive Mack test  Mr Elva Caputo is a 56-year-old male seen in consultation for a positive SHONA test   He was found to have a positive shona test in June of 2022 when he attempted to donate blood  Repeat testing on 08/12/2022 found him to have a positive anti IgG with negative complement SHONA  He has no signs or symptoms of anemia his most recent hemoglobin was 15 3  It is thought that this could be to a mononucleosis infection in March/April  He also had COVID infection in January  I explained in detail the complete workup below including repeat testing, haptoglobin, hemolysis smear, LD, CMP with direct bilirubin  I will call him with the results of these and if any further workup is necessary  I explained that his primary care doctor can monitor his CBC and CMP to assess for signs of anemia or elevated bilirubin that would indicate hemolysis every 6 months for the 1st year and then just yearly regular labs  He knows signs and symptoms of anemia for which he should call the office for including increasing fatigue, dyspnea on exertion, lightheadedness, dizziness, headaches, dark urine     - Ambulatory referral to Hematology / Oncology  - Direct antiglobulin test; Future  - Haptoglobin; Future  - Hemolysis Smear; Future  - LD,Blood; Future  - CBC and differential  - Comprehensive metabolic panel  - Bilirubin, direct; Future         There is no need for hematology follow-up at this time unless there are signs of hemolytic anemia  Patient voiced agreement and understanding to the above  Patient knows to call the Hematology/Oncology office with any questions and concerns regarding the above  Barrier(s) to care: None    The patient is able to self care     -------------------------------------------------------------------------------------------------------    Chief Complaint   Patient presents with    Consult       Referring provider:  Destini Roberto DO  [facility]  [address] [place] [address]    History of present illness:  Bernice Vega is a 50-year-old male with no significant past medical history seen in consultation for a positive kay test   He attempted to donate blood in June and was denied due to a positive kay  He was seen by his PCP and completed labs in the beginning of August which demonstrated a positive kay with anti IgG positive and complement negative  LD was normal   He has no signs of anemia  T bili in February was also normal   He states that he did have COVID in January and felt ill again in March/April  He noted increased fatigue after running, he is an avid runner and had never felt like that previously  He was able to do his normal activities without increasing fatigue  Blood work completed in August did demonstrate a past EBV infection but no current infections  He has never had a blood transfusion in his life  He worked donates blood regularly with his most recent donation in March of 2021  In February of 2022 he had a gallbladder removed but did not require any transfusions  He has no constitutional symptoms or complaints today  Review of Systems   Constitutional: Negative for activity change, appetite change, diaphoresis, fatigue, fever and unexpected weight change  HENT: Negative for trouble swallowing and voice change  Eyes: Negative for photophobia and visual disturbance  Respiratory: Negative for cough, chest tightness and shortness of breath  Cardiovascular: Positive for palpitations (with stress at work)  Negative for chest pain and leg swelling  Gastrointestinal: Negative for abdominal distention, anal bleeding, blood in stool, constipation, diarrhea, nausea and vomiting     Endocrine: Negative for cold intolerance  Genitourinary: Negative for dysuria, hematuria and urgency  Musculoskeletal: Negative for arthralgias, back pain, gait problem and joint swelling  Skin: Negative for pallor and rash  Neurological: Negative for dizziness, tremors, weakness, light-headedness, numbness and headaches  Hematological: Negative for adenopathy  Does not bruise/bleed easily  Psychiatric/Behavioral: Negative for confusion and sleep disturbance         Patient Active Problem List   Diagnosis    Actinic keratosis    Allergic rhinitis    Central serous retinopathy, left    External hemorrhoids    Migraine headache    Colon's neuroma of right foot    Overweight    Tinea cruris    Healthcare maintenance    [place] cardiac risk <10% in next 10 years    Erectile dysfunction    Prostate cancer screening    Screening for diabetes mellitus (DM)    Screening for cardiovascular, respiratory, and genitourinary diseases    Immunization due    Chronic gastritis without bleeding    Family history of celiac disease    Gastroesophageal reflux disease with esophagitis without hemorrhage    Personal history of colonic polyps    S/P laparoscopic cholecystectomy    Epigastric pain    Surgical follow-up care    Abnormal blood chemistry test    Positive Mack test    Witnessed episode of apnea       Past Medical History:   Diagnosis Date    Abdominal pain     started 4/2020    Bunion     Resolved 5/3/2015     Closed fracture of base of metatarsal bone     Last assessed 5/8/2006     Gastritis     GERD (gastroesophageal reflux disease)     Herpes zoster     Last assessed 8/21/2006     Pes planus, congenital     Resolved 10/9/2017     Plantar fibromatosis     Resolved 10/9/2017     Skin neoplasm     Resolved 2/21/2016     Verruca plana     Resolved 10/9/2017        Past Surgical History:   Procedure Laterality Date    HERNIA REPAIR      OR LAP,CHOLECYSTECTOMY N/A 3/1/2022    Procedure: CHOLECYSTECTOMY LAPAROSCOPIC;  Surgeon: Hermes Lopez MD;  Location: [facility];  Service: General    SKIN CANCER EXCISION Left     leg    UMBILICAL HERNIA REPAIR      UPPER GASTROINTESTINAL ENDOSCOPY         Family History   Problem Relation Age of Onset    Diabetes Mother     Gout Mother     Hypertension Mother     Lung cancer Father     Ulcerative colitis Brother        Social History     Socioeconomic History    Marital status: /Civil Union     Spouse name: None    Number of children: None    Years of education: None    Highest education level: None   Occupational History    None   Tobacco Use    Smoking status: Never Smoker    Smokeless tobacco: Never Used   Vaping Use    Vaping Use: Never used   Substance and Sexual Activity    Alcohol use:  Yes     Alcohol/week: 2 0 standard drinks     Types: 2 Cans of beer per week     Comment: social, 3-4 beers monthly    Drug use: No    Sexual activity: None   Other Topics Concern    None   Social History Narrative    None     Social Determinants of Health     Financial Resource Strain: Not on file   Food Insecurity: Not on file   Transportation Needs: Not on file   Physical Activity: Not on file   Stress: Not on file   Social Connections: Not on file   Intimate Partner Violence: Not on file   Housing Stability: Not on file         Current Outpatient Medications:     cholecalciferol (VITAMIN D3) 400 units tablet, Take 400 Units by mouth daily, Disp: , Rfl:     Multiple Vitamin (MULTI VITAMIN DAILY) TABS, Take by mouth, Disp: , Rfl:     tadalafil (CIALIS) 5 MG tablet, , Disp: , Rfl:     Allergies   Allergen Reactions    Cefadroxil Hives    Cefdinir Swelling    Cortisone Other (See Comments)    Cortizone-10 [Hydrocortisone] Other (See Comments)     causes retinal problems    Penicillins      Reaction Date: [id number];     Sulfamethoxazole-Trimethoprim Rash       Objective:  /76   Pulse 63   Temp 99 °F (37 2 °C)   Resp 18   Ht 5' 7" (1 702 m)   Wt 79 4 kg (175 lb)   SpO2 98%   BMI 27 41 kg/m²   Physical Exam    Result Review  Labs:   No visits with results within 1 Month(s) from this visit  Latest known visit with results is:   Orders Only on 08/12/2022   Component Date Value Ref Range Status    White Blood Cell Count 08/12/2022 7 3  3 4 - 10 8 x10E3/uL Final    Red Blood Cell Count 08/12/2022 5 13  4 14 - 5 80 x10E6/uL Final    Hemoglobin 08/12/2022 15 3  13 0 - 17 7 g/dL Final    HCT 08/12/2022 45 0  37 5 - 51 0 % Final    MCV 08/12/2022 88  79 - 97 fL Final    MCH 08/12/2022 29 8  26 6 - 33 0 pg Final    MCHC 08/12/2022 34 0  31 5 - 35 7 g/dL Final    RDW 08/12/2022 12 8  11 6 - 15 4 % Final    Platelet Count 07/74/8810 222  150 - 450 x10E3/uL Final    Neutrophils 08/12/2022 64  Not Estab  % Final    Lymphocytes 08/12/2022 27  Not Estab  % Final    Monocytes 08/12/2022 7  Not Estab  % Final    Eosinophils 08/12/2022 2  Not Estab  % Final    Basophils PCT 08/12/2022 0  Not Estab  % Final    Neutrophils (Absolute) 08/12/2022 4 6  1 4 - 7 0 x10E3/uL Final    Lymphocytes (Absolute) 08/12/2022 2 0  0 7 - 3 1 x10E3/uL Final    Monocytes (Absolute) 08/12/2022 0 5  0 1 - 0 9 x10E3/uL Final    Eosinophils (Absolute) 08/12/2022 0 2  0 0 - 0 4 x10E3/uL Final    Basophils ABS 08/12/2022 0 0  0 0 - 0 2 x10E3/uL Final    Immature Granulocytes 08/12/2022 0  Not Estab  % Final    Immature Granulocytes (Absolute) 08/12/2022 0 0  0 0 - 0 1 x10E3/uL Final    Comment: A hand-written panel/profile was received from your office  In  accordance with the [facility] Ambiguous Test Code Policy dated July 7445, we have assigned CBC with Differential/Platelet, Test Code  #902068 to this request  If this is not the testing you wished to  receive on this specimen, please contact the [facility] Department to clarify the test order  We  appreciate your business        EBV VCA IgM 08/12/2022 <36 0  0 0 - 35 9 U/mL Final    Comment:                                  Negative        <36 0                                   Equivocal 36 0 - 43 9                                   Positive        >43 9      EBV VCA IgG 08/12/2022 125 0 (A) 0 0 - 17 9 U/mL Final    Comment:                                  Negative        <18 0                                   Equivocal 18 0 - 21 9                                   Positive        >21 9      EBV Nuclear Ag Ab 08/12/2022 <18 0  0 0 - 17 9 U/mL Final    Comment:                                  Negative        <18 0                                   Equivocal 18 0 - 21 9                                   Positive        >21 9      INTERPRETATION 08/12/2022 Comment   Final    Comment:                EBV Interpretation Chart  Key: Antibody Present +    Antibody Absent -  Interpretation             VCA-IgM   VCA-IgG  EBNA-IgG  No previous infection/        -         -         -  Susceptible  Primary infection (new        +         +         -  or recent)  Past Infection               +or-       +         +  See comment below*            +         -         -  *Results indicate infection with EBV at some time   however cannot predict the timing of the infection   since antibodies to EBNA usually develop after   primary infection or, alternatively, approximately   5-10% of patients with EBV never develop antibodies   to EBNA   WINDY 08/12/2022 Negative  Negative Final    Uric Acid 08/12/2022 5 1  3 8 - 8 4 mg/dL Final               Therapeutic target for gout patients: <6 0    LDH 08/12/2022 154  121 - 224 IU/L Final    Direct Mack 08/12/2022 Positive (A) Negative Final    Anti-IgG 08/12/2022 Positive (A) Negative Final    Anti-Complement 08/12/2022 Negative  Negative Final    C-Reactive Protein, Quant 08/12/2022 <1  0 - 10 mg/L Final           Imaging:  No relevant imaging to review     Please note: This report has been generated by a voice recognition software system  Therefore there may be syntax, spelling, and/or grammatical errors  Please call if you have any questions

## 2022-10-01 LAB — HAPTOGLOB SERPL-MCNC: 70 MG/DL (ref 29–370)

## 2022-11-11 ENCOUNTER — TELEPHONE (OUTPATIENT)
Dept: FAMILY MEDICINE CLINIC | Facility: CLINIC | Age: 52
End: 2022-11-11

## 2022-11-11 NOTE — TELEPHONE ENCOUNTER
Has a CPE in December with dr Hudson Calderon  He needs routine blood work order    Please leave up front for     Thank you

## 2022-11-12 DIAGNOSIS — Z12.5 PROSTATE CANCER SCREENING: ICD-10-CM

## 2022-11-12 DIAGNOSIS — Z13.6 SCREENING FOR CARDIOVASCULAR, RESPIRATORY, AND GENITOURINARY DISEASES: Primary | ICD-10-CM

## 2022-11-12 DIAGNOSIS — Z13.1 SCREENING FOR DIABETES MELLITUS (DM): ICD-10-CM

## 2022-11-12 DIAGNOSIS — Z13.83 SCREENING FOR CARDIOVASCULAR, RESPIRATORY, AND GENITOURINARY DISEASES: Primary | ICD-10-CM

## 2022-11-12 DIAGNOSIS — Z13.89 SCREENING FOR CARDIOVASCULAR, RESPIRATORY, AND GENITOURINARY DISEASES: Primary | ICD-10-CM

## 2022-12-13 ENCOUNTER — RA CDI HCC (OUTPATIENT)
Dept: OTHER | Facility: HOSPITAL | Age: 52
End: 2022-12-13

## 2022-12-13 NOTE — PROGRESS NOTES
[facility]iel[facility]  coding opportunities       Chart reviewed, no opportunity found: CHART REVIEWED, NO OPPORTUNITY FOUND        Patients Insurance        Commercial Insurance: Sushila Black

## 2022-12-20 LAB
ALBUMIN SERPL-MCNC: 4.9 G/DL (ref 3.8–4.9)
ALBUMIN/GLOB SERPL: 2.7 {RATIO} (ref 1.2–2.2)
ALP SERPL-CCNC: 62 IU/L (ref 44–121)
ALT SERPL-CCNC: 17 IU/L (ref 0–44)
AST SERPL-CCNC: 23 IU/L (ref 0–40)
BILIRUB SERPL-MCNC: 0.9 MG/DL (ref 0–1.2)
BUN SERPL-MCNC: 19 MG/DL (ref 6–24)
BUN/CREAT SERPL: 17 (ref 9–20)
CALCIUM SERPL-MCNC: 9.8 MG/DL (ref 8.7–10.2)
CHLORIDE SERPL-SCNC: 99 MMOL/L (ref 96–106)
CHOLEST SERPL-MCNC: 183 MG/DL (ref 100–199)
CO2 SERPL-SCNC: 28 MMOL/L (ref 20–29)
CREAT SERPL-MCNC: 1.11 MG/DL (ref 0.76–1.27)
EGFR: 80 ML/MIN/1.73
GLOBULIN SER-MCNC: 1.8 G/DL (ref 1.5–4.5)
GLUCOSE SERPL-MCNC: 87 MG/DL (ref 70–99)
HDLC SERPL-MCNC: 41 MG/DL
LDLC SERPL CALC-MCNC: 120 MG/DL (ref 0–99)
LDLC/HDLC SERPL: 2.9 RATIO (ref 0–3.6)
MICRODELETION SYND BLD/T FISH: NORMAL
POTASSIUM SERPL-SCNC: 4.2 MMOL/L (ref 3.5–5.2)
PROT SERPL-MCNC: 6.7 G/DL (ref 6–8.5)
SL AMB VLDL CHOLESTEROL CALC: 22 MG/DL (ref 5–40)
SODIUM SERPL-SCNC: 139 MMOL/L (ref 134–144)
TRIGL SERPL-MCNC: 122 MG/DL (ref 0–149)

## 2022-12-25 PROBLEM — B35.6 TINEA CRURIS: Status: RESOLVED | Noted: 2019-06-07 | Resolved: 2022-12-25

## 2022-12-25 PROBLEM — Z09 SURGICAL FOLLOW-UP CARE: Status: RESOLVED | Noted: 2022-03-18 | Resolved: 2022-12-25

## 2022-12-25 PROBLEM — R79.9 ABNORMAL BLOOD CHEMISTRY TEST: Status: RESOLVED | Noted: 2022-08-08 | Resolved: 2022-12-25

## 2022-12-25 PROBLEM — R10.13 EPIGASTRIC PAIN: Status: RESOLVED | Noted: 2022-03-01 | Resolved: 2022-12-25

## 2022-12-25 PROBLEM — N52.9 ERECTILE DYSFUNCTION: Status: RESOLVED | Noted: 2020-05-12 | Resolved: 2022-12-25

## 2022-12-27 ENCOUNTER — OFFICE VISIT (OUTPATIENT)
Dept: FAMILY MEDICINE CLINIC | Facility: CLINIC | Age: 52
End: 2022-12-27

## 2022-12-27 VITALS
SYSTOLIC BLOOD PRESSURE: 112 MMHG | OXYGEN SATURATION: 98 % | BODY MASS INDEX: 28.22 KG/M2 | WEIGHT: 175.6 LBS | DIASTOLIC BLOOD PRESSURE: 78 MMHG | HEIGHT: 66 IN | RESPIRATION RATE: 18 BRPM | HEART RATE: 70 BPM | TEMPERATURE: 97.6 F

## 2022-12-27 DIAGNOSIS — Z87.438 HX OF CHRONIC PROSTATITIS: ICD-10-CM

## 2022-12-27 DIAGNOSIS — Z91.89 FRAMINGHAM CARDIAC RISK <10% IN NEXT 10 YEARS: ICD-10-CM

## 2022-12-27 DIAGNOSIS — Z00.00 HEALTHCARE MAINTENANCE: Primary | ICD-10-CM

## 2022-12-27 DIAGNOSIS — R06.09 DOE (DYSPNEA ON EXERTION): ICD-10-CM

## 2022-12-27 PROBLEM — G57.61 MORTON'S NEUROMA OF RIGHT FOOT: Status: RESOLVED | Noted: 2017-01-13 | Resolved: 2022-12-27

## 2022-12-27 PROBLEM — R76.8 POSITIVE COOMBS TEST: Status: RESOLVED | Noted: 2022-08-08 | Resolved: 2022-12-27

## 2022-12-27 NOTE — PROGRESS NOTES
Assessment/Plan:    Hx of chronic prostatitis  Dr Kenn Bills, offer cardio eval  Labs reviewed  Recurrent prostatitis hx, does see urologist sometimes  Mack test resolved     Diagnoses and all orders for this visit:    Healthcare maintenance  -     TSH, 3rd generation; Future    MARTELL (dyspnea on exertion)  -     Ambulatory referral to Cardiology; Future    Hx of chronic prostatitis    [place] cardiac risk <10% in next 10 years          Return if symptoms worsen or fail to improve  Subjective:      Patient ID: Peter Jasmine is a 47 y o  male  Chief Complaint   Patient presents with   • Physical Exam     Annual physical  Anuradha Brock       HPI  Exercises daily  Diet not great lately  Wt same  Drinks lots of green tea    Hx of prostatitis  Has seen Dr Diane Escalante in past in [facility]  Given daily cialis in past that helped  Stopped since not fond of daily meds    Work stress  Gets chest tightness at times  Feels sob with running a little  Anxiety? The following portions of the patient's history were reviewed and updated as appropriate: allergies, current medications, past family history, past medical history, past social history, past surgical history and problem list     Review of Systems   Constitutional: Negative for fever  Respiratory: Positive for shortness of breath  Current Outpatient Medications   Medication Sig Dispense Refill   • cholecalciferol (VITAMIN D3) 400 units tablet Take 400 Units by mouth daily     • Multiple Vitamin (MULTI VITAMIN DAILY) TABS Take by mouth       No current facility-administered medications for this visit  Objective:    /78   Pulse 70   Temp 97 6 °F (36 4 °C)   Resp 18   Ht 5' 6 25" (1 683 m)   Wt 79 7 kg (175 lb 9 6 oz)   SpO2 98%   BMI 28 13 kg/m²        Physical Exam  Vitals and nursing note reviewed  Constitutional:       General: He is not in acute distress  Appearance: He is well-developed  He is not ill-appearing     HENT: Head: Normocephalic  Right Ear: Tympanic membrane normal       Left Ear: Tympanic membrane normal    Eyes:      General: No scleral icterus  Conjunctiva/sclera: Conjunctivae normal    Neck:      Thyroid: No thyroid mass, thyromegaly or thyroid tenderness  Vascular: No carotid bruit  Cardiovascular:      Rate and Rhythm: Normal rate and regular rhythm  Heart sounds: No murmur heard  Pulmonary:      Effort: Pulmonary effort is normal  No respiratory distress  Breath sounds: No wheezing  Abdominal:      General: There is no distension  Palpations: Abdomen is soft  Hernia: No hernia is present  Genitourinary:     Penis: Normal        Testes: Normal       Prostate: Normal       Rectum: Normal    Musculoskeletal:         General: No deformity  Cervical back: Neck supple  No tenderness  Right lower leg: No edema  Left lower leg: No edema  Skin:     General: Skin is warm and dry  Coloration: Skin is not pale  Neurological:      Mental Status: He is alert  Psychiatric:         Mood and Affect: Mood normal          Behavior: Behavior normal          Thought Content:  Thought content normal                 Brii Seen, DO

## 2022-12-28 LAB
PSA SERPL-MCNC: 0.7 NG/ML (ref 0–4)
TSH SERPL DL<=0.005 MIU/L-ACNC: 3.09 UIU/ML (ref 0.45–4.5)

## 2023-12-19 ENCOUNTER — RA CDI HCC (OUTPATIENT)
Dept: OTHER | Facility: HOSPITAL | Age: 53
End: 2023-12-19

## 2023-12-19 NOTE — PROGRESS NOTES
HCC coding opportunities       Chart reviewed, no opportunity found: CHART REVIEWED, NO OPPORTUNITY FOUND        Patients Insurance        Commercial Insurance: [employer]

## 2023-12-20 ENCOUNTER — TELEPHONE (OUTPATIENT)
Age: 53
End: 2023-12-20

## 2023-12-20 NOTE — TELEPHONE ENCOUNTER
Pt. Called in  he will be picking up his lab orders this Friday in order to get them done at [facility]. Please have them ready for him.

## 2023-12-21 DIAGNOSIS — Z13.6 SCREENING FOR CARDIOVASCULAR, RESPIRATORY, AND GENITOURINARY DISEASES: Primary | ICD-10-CM

## 2023-12-21 DIAGNOSIS — Z13.1 SCREENING FOR DIABETES MELLITUS (DM): ICD-10-CM

## 2023-12-21 DIAGNOSIS — Z13.83 SCREENING FOR CARDIOVASCULAR, RESPIRATORY, AND GENITOURINARY DISEASES: Primary | ICD-10-CM

## 2023-12-21 DIAGNOSIS — Z13.89 SCREENING FOR CARDIOVASCULAR, RESPIRATORY, AND GENITOURINARY DISEASES: Primary | ICD-10-CM

## 2023-12-21 NOTE — TELEPHONE ENCOUNTER
A additional labslip was printed for   Please let him know  His last yearly PSA was 12/27/23 so I did not order one but if he knows it will be covered before one year, I can add a PSA lab slip

## 2023-12-23 LAB
ALBUMIN SERPL-MCNC: 5.1 G/DL (ref 3.8–4.9)
ALBUMIN/GLOB SERPL: 2.3 {RATIO} (ref 1.2–2.2)
ALP SERPL-CCNC: 60 IU/L (ref 44–121)
ALT SERPL-CCNC: 20 IU/L (ref 0–44)
AST SERPL-CCNC: 27 IU/L (ref 0–40)
BILIRUB SERPL-MCNC: 0.8 MG/DL (ref 0–1.2)
BUN SERPL-MCNC: 16 MG/DL (ref 6–24)
BUN/CREAT SERPL: 14 (ref 9–20)
CALCIUM SERPL-MCNC: 9.9 MG/DL (ref 8.7–10.2)
CHLORIDE SERPL-SCNC: 100 MMOL/L (ref 96–106)
CHOLEST SERPL-MCNC: 187 MG/DL (ref 100–199)
CO2 SERPL-SCNC: 27 MMOL/L (ref 20–29)
CREAT SERPL-MCNC: 1.12 MG/DL (ref 0.76–1.27)
EGFR: 79 ML/MIN/1.73
GLOBULIN SER-MCNC: 2.2 G/DL (ref 1.5–4.5)
GLUCOSE SERPL-MCNC: 88 MG/DL (ref 70–99)
HDLC SERPL-MCNC: 47 MG/DL
LDLC SERPL CALC-MCNC: 125 MG/DL (ref 0–99)
MICRODELETION SYND BLD/T FISH: NORMAL
POTASSIUM SERPL-SCNC: 4.7 MMOL/L (ref 3.5–5.2)
PROT SERPL-MCNC: 7.3 G/DL (ref 6–8.5)
SODIUM SERPL-SCNC: 139 MMOL/L (ref 134–144)
TRIGL SERPL-MCNC: 82 MG/DL (ref 0–149)

## 2023-12-25 ENCOUNTER — TELEPHONE (OUTPATIENT)
Dept: FAMILY MEDICINE CLINIC | Facility: CLINIC | Age: 53
End: 2023-12-25

## 2023-12-25 PROBLEM — M85.679 BONE CYST OF FOOT: Status: ACTIVE | Noted: 2023-01-18

## 2023-12-26 NOTE — TELEPHONE ENCOUNTER
Discussed the POC with the [facility] team   Pt will be discharged to [facility] and resume services with [facility]  Pt will either be transported via [address] or family to transport  Done NFA

## 2023-12-28 ENCOUNTER — OFFICE VISIT (OUTPATIENT)
Dept: FAMILY MEDICINE CLINIC | Facility: CLINIC | Age: 53
End: 2023-12-28
Payer: COMMERCIAL

## 2023-12-28 VITALS
BODY MASS INDEX: 27.21 KG/M2 | HEART RATE: 68 BPM | DIASTOLIC BLOOD PRESSURE: 72 MMHG | SYSTOLIC BLOOD PRESSURE: 112 MMHG | TEMPERATURE: 98 F | WEIGHT: 173.4 LBS | RESPIRATION RATE: 16 BRPM | HEIGHT: 67 IN

## 2023-12-28 DIAGNOSIS — J31.0 CHRONIC RHINITIS: ICD-10-CM

## 2023-12-28 DIAGNOSIS — Z12.5 PROSTATE CANCER SCREENING: ICD-10-CM

## 2023-12-28 DIAGNOSIS — Z00.00 HEALTHCARE MAINTENANCE: Primary | ICD-10-CM

## 2023-12-28 PROCEDURE — 99396 PREV VISIT EST AGE 40-64: CPT | Performed by: FAMILY MEDICINE

## 2023-12-28 RX ORDER — KETOCONAZOLE 20 MG/G
CREAM TOPICAL DAILY
COMMUNITY

## 2023-12-28 RX ORDER — AZELASTINE 1 MG/ML
2 SPRAY, METERED NASAL 2 TIMES DAILY
Qty: 30 ML | Refills: 5 | Status: SHIPPED | OUTPATIENT
Start: 2023-12-28

## 2023-12-28 RX ORDER — TACROLIMUS 1 MG/G
OINTMENT TOPICAL 2 TIMES DAILY
COMMUNITY

## 2023-12-28 NOTE — PROGRESS NOTES
Assessment/Plan:    No problem-specific Assessment & Plan notes found for this encounter.    Cpe    Rhinitis with , suggest trial of astelin nasal spray to avoid steroids    nicotinamide suggested by dermatologist  Pt dx with psoriasis     Diagnoses and all orders for this visit:    Healthcare maintenance    Prostate cancer screening  -     PSA, Total Screen; Future    Chronic rhinitis  -     azelastine (ASTELIN) 0.1 % nasal spray; 2 sprays into each nostril 2 (two) times a day Use in each nostril as directed    Other orders  -     ketoconazole (NIZORAL) 2 % cream; Apply topically daily  -     tacrolimus (PROTOPIC) 0.1 % ointment; Apply topically 2 (two) times a day        The 10-year ASCVD risk score (Sheron WILLINGHAM, et al., 2019) is: 3.3%    Values used to calculate the score:      Age: 53 years      Sex: Male      Is Non- : No      Diabetic: No      Tobacco smoker: No      Systolic Blood Pressure: 112 mmHg      Is BP treated: No      HDL Cholesterol: 47 mg/dL      Total Cholesterol: 187 mg/dL      Return if symptoms worsen or fail to improve.    Subjective:      Patient ID: Rocky John is a 53 y.o. male.    Chief Complaint   Patient presents with    Physical Exam     Patient concerned with blood work.   Sabrina Prince, YESY     COVID-19     Patient states he had covid about 1 month ago.    post nasal drip     Every morning/night     GI Problem     Gi issues when drinking coffee        HPI  Had labs  Reviewed, lidia albumin and AG ratio  Could eat better  Still exercising    Other issues  Covid last November  Interested in booster  Post nasal drip since, coughs in am  No discolored mucus  Hx of allergies  GB removed 2y ago  Notes pain after coffee for a while so has since avoided and better    Derm in [place]  Had skin check  Given ketoconazole and tacrolimus  Dx with psoriasis in gluteal cleft    The following portions of the patient's history were reviewed and updated as appropriate:  "allergies, current medications, past family history, past medical history, past social history, past surgical history and problem list.    Review of Systems   Constitutional:  Negative for chills and fever.   Skin:  Positive for rash.         Current Outpatient Medications   Medication Sig Dispense Refill    azelastine (ASTELIN) 0.1 % nasal spray 2 sprays into each nostril 2 (two) times a day Use in each nostril as directed 30 mL 5    cholecalciferol (VITAMIN D3) 400 units tablet Take 400 Units by mouth daily      ketoconazole (NIZORAL) 2 % cream Apply topically daily      Multiple Vitamin (MULTI VITAMIN DAILY) TABS Take by mouth      tacrolimus (PROTOPIC) 0.1 % ointment Apply topically 2 (two) times a day       No current facility-administered medications for this visit.       Objective:    /72   Pulse 68   Temp 98 °F (36.7 °C)   Resp 16   Ht 5' 7\" (1.702 m)   Wt 78.7 kg (173 lb 6.4 oz)   BMI 27.16 kg/m²        Physical Exam  Vitals and nursing note reviewed.   Constitutional:       General: He is not in acute distress.     Appearance: He is well-developed. He is not ill-appearing.   HENT:      Head: Normocephalic.      Right Ear: Tympanic membrane normal.      Left Ear: Tympanic membrane normal.   Eyes:      General: No scleral icterus.     Conjunctiva/sclera: Conjunctivae normal.   Cardiovascular:      Rate and Rhythm: Normal rate and regular rhythm.      Heart sounds: No murmur heard.  Pulmonary:      Effort: Pulmonary effort is normal. No respiratory distress.      Breath sounds: No wheezing.   Abdominal:      General: There is no distension.      Palpations: Abdomen is soft.      Hernia: No hernia is present.   Genitourinary:     Penis: Normal.       Testes: Normal.      Prostate: Normal.      Rectum: Normal.   Musculoskeletal:         General: No deformity.      Cervical back: Neck supple.      Right lower leg: No edema.      Left lower leg: No edema.   Skin:     General: Skin is warm and dry.     "  Coloration: Skin is not pale.      Findings: Rash present.      Comments: Red in gluteal cleft   Neurological:      Mental Status: He is alert.      Motor: No weakness.      Gait: Gait normal.   Psychiatric:         Mood and Affect: Mood normal.         Behavior: Behavior normal.         Thought Content: Thought content normal.       BMI Counseling: Body mass index is 27.16 kg/m². The BMI is above normal. Nutrition recommendations include decreasing portion sizes and moderation in carbohydrate intake. Exercise recommendations include exercising 3-5 times per week. No pharmacotherapy was ordered. Rationale for BMI follow-up plan is due to patient being overweight or obese.     Depression Screening and Follow-up Plan: Patient was screened for depression during today's encounter. They screened negative with a PHQ-2 score of 0.    BMI likely skewed by muscle mass.           Dereje Ramon DO

## 2024-02-21 PROBLEM — Z13.1 SCREENING FOR DIABETES MELLITUS (DM): Status: RESOLVED | Noted: 2020-08-14 | Resolved: 2024-02-21

## 2024-02-21 PROBLEM — Z00.00 HEALTHCARE MAINTENANCE: Status: RESOLVED | Noted: 2019-06-07 | Resolved: 2024-02-21

## 2024-02-21 PROBLEM — Z13.89 SCREENING FOR CARDIOVASCULAR, RESPIRATORY, AND GENITOURINARY DISEASES: Status: RESOLVED | Noted: 2020-08-14 | Resolved: 2024-02-21

## 2024-02-21 PROBLEM — Z13.83 SCREENING FOR CARDIOVASCULAR, RESPIRATORY, AND GENITOURINARY DISEASES: Status: RESOLVED | Noted: 2020-08-14 | Resolved: 2024-02-21

## 2024-02-21 PROBLEM — Z12.5 PROSTATE CANCER SCREENING: Status: RESOLVED | Noted: 2020-08-14 | Resolved: 2024-02-21

## 2024-02-21 PROBLEM — Z13.6 SCREENING FOR CARDIOVASCULAR, RESPIRATORY, AND GENITOURINARY DISEASES: Status: RESOLVED | Noted: 2020-08-14 | Resolved: 2024-02-21

## 2024-03-22 ENCOUNTER — OFFICE VISIT (OUTPATIENT)
Dept: GASTROENTEROLOGY | Facility: CLINIC | Age: 54
End: 2024-03-22
Payer: COMMERCIAL

## 2024-03-22 VITALS
BODY MASS INDEX: 27.31 KG/M2 | WEIGHT: 174 LBS | DIASTOLIC BLOOD PRESSURE: 82 MMHG | HEIGHT: 67 IN | HEART RATE: 67 BPM | SYSTOLIC BLOOD PRESSURE: 134 MMHG

## 2024-03-22 DIAGNOSIS — Z90.49 S/P LAPAROSCOPIC CHOLECYSTECTOMY: ICD-10-CM

## 2024-03-22 DIAGNOSIS — K29.70 GASTRITIS WITHOUT BLEEDING, UNSPECIFIED CHRONICITY, UNSPECIFIED GASTRITIS TYPE: ICD-10-CM

## 2024-03-22 DIAGNOSIS — Z83.79 FAMILY HISTORY OF CELIAC DISEASE: ICD-10-CM

## 2024-03-22 DIAGNOSIS — R10.13 EPIGASTRIC PAIN: Primary | ICD-10-CM

## 2024-03-22 DIAGNOSIS — D36.9 ADENOMATOUS POLYP: ICD-10-CM

## 2024-03-22 PROCEDURE — 99213 OFFICE O/P EST LOW 20 MIN: CPT | Performed by: NURSE PRACTITIONER

## 2024-03-22 RX ORDER — OMEPRAZOLE 20 MG/1
20 CAPSULE, DELAYED RELEASE ORAL DAILY
Qty: 90 CAPSULE | Refills: 0 | Status: SHIPPED | OUTPATIENT
Start: 2024-03-22 | End: 2024-03-22

## 2024-03-22 RX ORDER — OMEPRAZOLE 20 MG/1
20 CAPSULE, DELAYED RELEASE ORAL DAILY
Qty: 90 CAPSULE | Refills: 0 | Status: SHIPPED | OUTPATIENT
Start: 2024-03-22

## 2024-03-22 NOTE — PATIENT INSTRUCTIONS
Scheduled date of EGD(as of today):07/02/24  Physician performing EGD:Chris  Location of EGD:[facility]  Instructions reviewed with patient by:Carol Sherwood:  None   Diet for Stomach Ulcers and Gastritis   WHAT YOU NEED TO KNOW:   A diet for stomach ulcers and gastritis is a meal plan that limits foods that irritate your stomach. Certain foods may worsen symptoms such as stomach pain, bloating, heartburn, or indigestion.  DISCHARGE INSTRUCTIONS:   Foods to limit or avoid:  You may need to avoid acidic, spicy, or high-fat foods. Not all foods affect everyone the same way. You will need to learn which foods worsen your symptoms and limit those foods. The following are some foods that may worsen ulcer or gastritis symptoms:  Beverages:      Whole milk and chocolate milk    Hot cocoa and cola    Any beverage with caffeine    Regular and decaffeinated coffee    Peppermint and spearmint tea    Green and black tea, with or without caffeine    Orange and grapefruit juices    Drinks that contain alcohol    Spices and seasonings:      Black and red pepper    Chili powder    Mustard seed and nutmeg    Other foods:      Dairy foods made from whole milk or cream    Chocolate    Spicy or strongly flavored cheeses, such as jalapeno or black pepper    Highly seasoned, high-fat meats, such as sausage, salami, salmon, ham, and cold cuts    Hot chiles and peppers    Tomato products, such as tomato paste, tomato sauce, or tomato juice    Foods to include:  Eat a variety of healthy foods from all the food groups. Eat fruits, vegetables, whole grains, and fat-free or low-fat dairy foods. Whole grains include whole-wheat breads, cereals, pasta, and brown rice. Choose lean meats, poultry (chicken and turkey), fish, beans, eggs, and nuts. A healthy meal plan is low in unhealthy fats, salt, and added sugar. Healthy fats include olive oil and canola oil. Ask your dietitian for more information about a healthy diet.       Other helpful  guidelines:   Do not eat right before bedtime.  Stop eating at least 2 hours before bedtime.    Eat small, frequent meals.  Your stomach may tolerate small, frequent meals better than large meals.    © Copyright [employer] 2023 Information is for End User's use only and may not be sold, redistributed or otherwise used for commercial purposes.  The above information is an  only. It is not intended as medical advice for individual conditions or treatments. Talk to your doctor, nurse or pharmacist before following any medical regimen to see if it is safe and effective for you.

## 2024-03-22 NOTE — PROGRESS NOTES
[facility] [place] - Outpatient Consultation  Rocky John 53 y.o. male MRN: [id number]  Encounter: [id number]          ASSESSMENT AND PLAN:      1. Epigastric pain  2. Gastritis without bleeding, unspecified chronicity, unspecified gastritis type  Intermittent burning epigastric pain worsened by coffee, mint gum, milk most suggestive of gastritis, r/o PUD, H.pylori infection. He did have gastritis on last EGD in 2021 and bx negative since that time. Since that time he has had his gallbladder removed, bile reflux is a possibility. He denies any NSAID use, recent antibiotics, steroids. Symptoms have persistent since November, respond to Tums/Pepto.  -Start Prilosec daily 1/2 hour before breakfast  -EGD scheduled. Prep and procedure explained  -Continue to avoid NSAIDs. Gastritis diet discussed.  -May benefit from Carafate/Questran in the future  -     omeprazole (PriLOSEC) 20 mg delayed release capsule; Take 1 capsule (20 mg total) by mouth daily  -     EGD; Future; Expected date: 03/22/2024    3. S/P laparoscopic cholecystectomy  March 2022 by Dr. Doyle. Reports some looser type stool since that time, but no significant diarrhea. Discussed Questran if diarrhea becomes more frequent in the future.    4. Adenomatous polyp  Due for colonoscopy September 2026    5. Family history of celiac disease  In his daughter. He had celiac panel in the past showing normal IgA and TTG IGA AB so negative for Celiac disease. Noted to have a weak positive TTG Igg Ab    September 2026 due for colonoscopy  ______________________________________________________________________    HPI:  Rocky John is a 53 y.o. male presenting for follow up for evaluation of epigastric pain.    Epigastric burning pain started in the fall of last year, happens daily. No NSAIDs, no recent steroids/abx.  Doesn't notice and HB or reflux. No dysphagia/odynophagia, weight loss, melena.   Worse with coffee, mint gum, pizza, sometimes  wakes in the middle of the night  Tried Pepto, Tums which helps  2 glasses of wine weekly, no smoking  No family history of esophageal, stomach, colon cancer. Up to date on colon cancer screening.      REVIEW OF SYSTEMS:    CONSTITUTIONAL: Denies any fever, chills, rigors, and weight loss.  HEENT: No earache or tinnitus.  CARDIOVASCULAR: No chest pain or palpitations.   RESPIRATORY: Denies any cough, hemoptysis, shortness of breath or dyspnea on exertion.  GASTROINTESTINAL: As noted in the History of Present Illness.   GENITOURINARY: Denies any hematuria or dysuria.  NEUROLOGIC: No dizziness or vertigo.   MUSCULOSKELETAL: Denies any joint swellings.  SKIN: Denies skin rashes or itching.   ENDOCRINE: Denies excessive thirst. Denies intolerance to heat or cold.  PSYCHOSOCIAL: Denies depression or anxiety. Denies any recent memory loss.       Historical Information   Past Medical History:   Diagnosis Date    Abdominal pain     started 4/2020    Bunion     Resolved 5/3/2015     Closed fracture of base of metatarsal bone     Last assessed 5/8/2006     Gastritis     GERD (gastroesophageal reflux disease)     Herpes zoster     Last assessed 8/21/2006     Pes planus, congenital     Resolved 10/9/2017     Plantar fibromatosis     Resolved 10/9/2017     Skin neoplasm     Resolved 2/21/2016     Verruca plana     Resolved 10/9/2017      Past Surgical History:   Procedure Laterality Date    HERNIA REPAIR      DE LAPAROSCOPY SURG CHOLECYSTECTOMY N/A 3/1/2022    Procedure: CHOLECYSTECTOMY LAPAROSCOPIC;  Surgeon: Dick Doyle MD;  Location: [facility];  Service: General    SKIN CANCER EXCISION Left     leg    UMBILICAL HERNIA REPAIR      UPPER GASTROINTESTINAL ENDOSCOPY       Social History   Social History     Substance and Sexual Activity   Alcohol Use Yes    Alcohol/week: 2.0 standard drinks of alcohol    Types: 2 Cans of beer per week    Comment: social, 3-4 beers monthly     Social History     Substance and Sexual Activity  "  Drug Use No     Social History     Tobacco Use   Smoking Status Never   Smokeless Tobacco Never     Family History   Problem Relation Age of Onset    Diabetes Mother     Gout Mother     Hypertension Mother     Lung cancer Father     Ulcerative colitis Brother        Meds/Allergies       Current Outpatient Medications:     cholecalciferol (VITAMIN D3) 400 units tablet    Multiple Vitamin (MULTI VITAMIN DAILY) TABS    omeprazole (PriLOSEC) 20 mg delayed release capsule    azelastine (ASTELIN) 0.1 % nasal spray    ketoconazole (NIZORAL) 2 % cream    tacrolimus (PROTOPIC) 0.1 % ointment    Allergies   Allergen Reactions    Cefadroxil Hives    Cefdinir Swelling    Cortisone Other (See Comments)    Cortizone-10 [Hydrocortisone] Other (See Comments)     causes retinal problems    Penicillins      Reaction Date: 07May2012;     Sulfamethoxazole-Trimethoprim Rash           Objective     Blood pressure 134/82, pulse 67, height 5' 7\" (1.702 m), weight 78.9 kg (174 lb). Body mass index is 27.25 kg/m².        PHYSICAL EXAM:      General Appearance:   Alert, cooperative, no distress   HEENT:   Normocephalic, atraumatic, anicteric.     Neck:  Supple, symmetrical, trachea midline   Lungs:   Clear to auscultation bilaterally; no rales, rhonchi or wheezing; respirations unlabored    Heart::   Regular rate and rhythm; no murmur.   Abdomen:   Soft, non-tender, non-distended; normal bowel sounds; no masses, no organomegaly    Genitalia:   Deferred    Rectal:   Deferred    Extremities:  No cyanosis, clubbing or edema    Skin:  No jaundice, rashes, or lesions    Lymph nodes:  No palpable cervical lymphadenopathy        Lab Results:   No visits with results within 1 Day(s) from this visit.   Latest known visit with results is:   Orders Only on 12/22/2023   Component Date Value    Glucose, Random 12/22/2023 88     BUN 12/22/2023 16     Creatinine 12/22/2023 1.12     eGFR 12/22/2023 79     SL AMB BUN/CREATININE RA* 12/22/2023 14     Sodium " 12/22/2023 139     Potassium 12/22/2023 4.7     Chloride 12/22/2023 100     CO2 12/22/2023 27     CALCIUM 12/22/2023 9.9     Protein, Total 12/22/2023 7.3     Albumin 12/22/2023 5.1 (H)     Globulin, Total 12/22/2023 2.2     Albumin/Globulin Ratio 12/22/2023 2.3 (H)     TOTAL BILIRUBIN 12/22/2023 0.8     Alk Phos Isoenzymes 12/22/2023 60     AST 12/22/2023 27     ALT 12/22/2023 20     Cholesterol, Total 12/22/2023 187     Triglycerides 12/22/2023 82     HDL 12/22/2023 47     LDL Calculated 12/22/2023 125 (H)     Interpretation 12/22/2023 Note          Radiology Results:   No results found.

## 2024-04-13 LAB — PSA SERPL-MCNC: 0.7 NG/ML (ref 0–4)

## 2024-06-18 ENCOUNTER — ANESTHESIA (OUTPATIENT)
Dept: ANESTHESIOLOGY | Facility: HOSPITAL | Age: 54
End: 2024-06-18

## 2024-06-18 ENCOUNTER — ANESTHESIA EVENT (OUTPATIENT)
Dept: ANESTHESIOLOGY | Facility: HOSPITAL | Age: 54
End: 2024-06-18

## 2024-07-02 ENCOUNTER — ANESTHESIA (OUTPATIENT)
Dept: GASTROENTEROLOGY | Facility: AMBULARY SURGERY CENTER | Age: 54
End: 2024-07-02

## 2024-07-02 ENCOUNTER — HOSPITAL ENCOUNTER (OUTPATIENT)
Dept: GASTROENTEROLOGY | Facility: AMBULARY SURGERY CENTER | Age: 54
Setting detail: OUTPATIENT SURGERY
Discharge: HOME | End: 2024-07-02
Attending: INTERNAL MEDICINE
Payer: COMMERCIAL

## 2024-07-02 ENCOUNTER — ANESTHESIA EVENT (OUTPATIENT)
Dept: GASTROENTEROLOGY | Facility: AMBULARY SURGERY CENTER | Age: 54
End: 2024-07-02

## 2024-07-02 VITALS
DIASTOLIC BLOOD PRESSURE: 72 MMHG | TEMPERATURE: 97.7 F | OXYGEN SATURATION: 97 % | RESPIRATION RATE: 18 BRPM | HEART RATE: 62 BPM | SYSTOLIC BLOOD PRESSURE: 117 MMHG

## 2024-07-02 DIAGNOSIS — Z83.79 FAMILY HISTORY OF CELIAC DISEASE: ICD-10-CM

## 2024-07-02 DIAGNOSIS — K29.70 GASTRITIS WITHOUT BLEEDING, UNSPECIFIED CHRONICITY, UNSPECIFIED GASTRITIS TYPE: ICD-10-CM

## 2024-07-02 DIAGNOSIS — R10.13 EPIGASTRIC PAIN: ICD-10-CM

## 2024-07-02 PROCEDURE — 88342 IMHCHEM/IMCYTCHM 1ST ANTB: CPT | Performed by: PATHOLOGY

## 2024-07-02 PROCEDURE — 88305 TISSUE EXAM BY PATHOLOGIST: CPT | Performed by: PATHOLOGY

## 2024-07-02 PROCEDURE — 43239 EGD BIOPSY SINGLE/MULTIPLE: CPT | Performed by: INTERNAL MEDICINE

## 2024-07-02 PROCEDURE — 88341 IMHCHEM/IMCYTCHM EA ADD ANTB: CPT | Performed by: PATHOLOGY

## 2024-07-02 RX ORDER — PROPOFOL 10 MG/ML
INJECTION, EMULSION INTRAVENOUS AS NEEDED
Status: DISCONTINUED | OUTPATIENT
Start: 2024-07-02 | End: 2024-07-02

## 2024-07-02 RX ORDER — SODIUM CHLORIDE, SODIUM LACTATE, POTASSIUM CHLORIDE, CALCIUM CHLORIDE 600; 310; 30; 20 MG/100ML; MG/100ML; MG/100ML; MG/100ML
125 INJECTION, SOLUTION INTRAVENOUS CONTINUOUS
Status: DISCONTINUED | OUTPATIENT
Start: 2024-07-02 | End: 2024-07-06 | Stop reason: HOSPADM

## 2024-07-02 RX ORDER — ONDANSETRON 2 MG/ML
4 INJECTION INTRAMUSCULAR; INTRAVENOUS ONCE AS NEEDED
Status: CANCELLED | OUTPATIENT
Start: 2024-07-02

## 2024-07-02 RX ORDER — LIDOCAINE HYDROCHLORIDE 20 MG/ML
INJECTION, SOLUTION EPIDURAL; INFILTRATION; INTRACAUDAL; PERINEURAL AS NEEDED
Status: DISCONTINUED | OUTPATIENT
Start: 2024-07-02 | End: 2024-07-02

## 2024-07-02 RX ORDER — OMEPRAZOLE 20 MG/1
20 CAPSULE, DELAYED RELEASE ORAL DAILY
Qty: 90 CAPSULE | Refills: 1 | Status: SHIPPED | OUTPATIENT
Start: 2024-07-02

## 2024-07-02 RX ADMIN — PROPOFOL 50 MG: 10 INJECTION, EMULSION INTRAVENOUS at 09:55

## 2024-07-02 RX ADMIN — PROPOFOL 100 MG: 10 INJECTION, EMULSION INTRAVENOUS at 09:50

## 2024-07-02 RX ADMIN — PROPOFOL 30 MG: 10 INJECTION, EMULSION INTRAVENOUS at 09:57

## 2024-07-02 RX ADMIN — PROPOFOL 50 MG: 10 INJECTION, EMULSION INTRAVENOUS at 09:51

## 2024-07-02 RX ADMIN — SODIUM CHLORIDE, SODIUM LACTATE, POTASSIUM CHLORIDE, AND CALCIUM CHLORIDE: .6; .31; .03; .02 INJECTION, SOLUTION INTRAVENOUS at 09:49

## 2024-07-02 RX ADMIN — LIDOCAINE HYDROCHLORIDE 80 MG: 20 INJECTION, SOLUTION EPIDURAL; INFILTRATION; INTRACAUDAL; PERINEURAL at 09:50

## 2024-07-02 RX ADMIN — PROPOFOL 50 MG: 10 INJECTION, EMULSION INTRAVENOUS at 09:52

## 2024-07-02 NOTE — ANESTHESIA PREPROCEDURE EVALUATION
Procedure:  EGD    Relevant Problems   ANESTHESIA (within normal limits)      CARDIO (within normal limits)   (+) Migraine headache      GI/HEPATIC   (+) Gastroesophageal reflux disease with esophagitis without hemorrhage      NEURO/PSYCH   (+) Migraine headache      PULMONARY (within normal limits)        Physical Exam    Airway    Mallampati score: II  TM Distance: >3 FB  Neck ROM: full     Dental   No notable dental hx     Cardiovascular  Rhythm: regular, Rate: normal    Pulmonary   Breath sounds clear to auscultation    Other Findings        Anesthesia Plan  ASA Score- 2     Anesthesia Type- IV sedation with anesthesia with ASA Monitors.         Additional Monitors:     Airway Plan:            Plan Factors-Exercise tolerance (METS): >4 METS.    Chart reviewed.   Existing labs reviewed. Patient summary reviewed.    Patient is not a current smoker.              Induction-     Postoperative Plan-         Informed Consent- Anesthetic plan and risks discussed with patient.  I personally reviewed this patient with the CRNA. Discussed and agreed on the Anesthesia Plan with the CRNA..

## 2024-07-02 NOTE — H&P
History and Physical - [facility]  Rocky John 54 y.o. male MRN: [id number]                  HPI: Rocky John is a 54 y.o. year old male who presents for dyspepsia      REVIEW OF SYSTEMS: Per the HPI, and otherwise unremarkable.    Historical Information   Past Medical History:   Diagnosis Date    Abdominal pain     started 4/2020    Bunion     Resolved 5/3/2015     Closed fracture of base of metatarsal bone     Last assessed 5/8/2006     Gastritis     GERD (gastroesophageal reflux disease)     Herpes zoster     Last assessed 8/21/2006     Pes planus, congenital     Resolved 10/9/2017     Plantar fibromatosis     Resolved 10/9/2017     Skin neoplasm     Resolved 2/21/2016     Verruca plana     Resolved 10/9/2017      Past Surgical History:   Procedure Laterality Date    HERNIA REPAIR      [place] LAPAROSCOPY SURG CHOLECYSTECTOMY N/A 3/1/2022    Procedure: CHOLECYSTECTOMY LAPAROSCOPIC;  Surgeon: Dick Doyle MD;  Location: [facility];  Service: General    SKIN CANCER EXCISION Left     leg    UMBILICAL HERNIA REPAIR      UPPER GASTROINTESTINAL ENDOSCOPY       Social History   Social History     Substance and Sexual Activity   Alcohol Use Yes    Alcohol/week: 2.0 standard drinks of alcohol    Types: 2 Cans of beer per week    Comment: social, 3-4 beers monthly     Social History     Substance and Sexual Activity   Drug Use No     Social History     Tobacco Use   Smoking Status Never   Smokeless Tobacco Never     Family History   Problem Relation Age of Onset    Diabetes Mother     Gout Mother     Hypertension Mother     Lung cancer Father     Ulcerative colitis Brother        Meds/Allergies       Current Outpatient Medications:     cholecalciferol (VITAMIN D3) 400 units tablet    Multiple Vitamin (MULTI VITAMIN DAILY) TABS    omeprazole (PriLOSEC) 20 mg delayed release capsule    azelastine (ASTELIN) 0.1 % nasal spray    ketoconazole (NIZORAL) 2 % cream    tacrolimus (PROTOPIC) 0.1 %  ointment    Current Facility-Administered Medications:     lactated ringers infusion, 125 mL/hr, Intravenous, Continuous    Allergies   Allergen Reactions    Cefadroxil Hives    Cefdinir Swelling    Cortisone Other (See Comments)    Cortizone-10 [Hydrocortisone] Other (See Comments)     causes retinal problems    Penicillins      Reaction Date: 07May2012;     Sulfamethoxazole-Trimethoprim Rash       Objective     /76   Pulse (!) 53   Temp 97.7 °F (36.5 °C) (Temporal)   Resp 18   SpO2 100%       PHYSICAL EXAM    Gen: NAD  Head: NCAT  CV: RRR  CHEST: Clear  ABD: soft, NT/ND  EXT: no edema      ASSESSMENT/PLAN:  This is a 54 y.o. year old male here for EGD, and he is stable and optimized for his procedure.

## 2024-07-02 NOTE — ANESTHESIA POSTPROCEDURE EVALUATION
Post-Op Assessment Note    CV Status:  Stable  Pain Score: 0    Pain management: adequate       Mental Status:  Sleepy   Hydration Status:  Stable   PONV Controlled:  None   Airway Patency:  Patent     Post Op Vitals Reviewed: Yes    No anethesia notable event occurred.    Staff: Anesthesiologist, CRNA               BP   110/68   Temp      Pulse  61   Resp   14   SpO2   98

## 2024-07-08 PROCEDURE — 88341 IMHCHEM/IMCYTCHM EA ADD ANTB: CPT | Performed by: PATHOLOGY

## 2024-07-08 PROCEDURE — 88305 TISSUE EXAM BY PATHOLOGIST: CPT | Performed by: PATHOLOGY

## 2024-07-08 PROCEDURE — 88342 IMHCHEM/IMCYTCHM 1ST ANTB: CPT | Performed by: PATHOLOGY

## 2024-08-06 ENCOUNTER — TELEPHONE (OUTPATIENT)
Dept: GASTROENTEROLOGY | Facility: CLINIC | Age: 54
End: 2024-08-06

## 2024-08-06 NOTE — TELEPHONE ENCOUNTER
----- Message from Penny SANCHEZ sent at 8/6/2024  1:21 PM EDT -----  Left detailed message regarding results and recommendations. Left Gi # for pt to call to schedule F/U visit as per Dr. Perez.    Please schedule pt for F/U visit.

## 2024-12-18 ENCOUNTER — OFFICE VISIT (OUTPATIENT)
Dept: GASTROENTEROLOGY | Facility: CLINIC | Age: 54
End: 2024-12-18
Payer: COMMERCIAL

## 2024-12-18 VITALS
HEART RATE: 76 BPM | HEIGHT: 67 IN | WEIGHT: 179 LBS | BODY MASS INDEX: 28.09 KG/M2 | SYSTOLIC BLOOD PRESSURE: 106 MMHG | DIASTOLIC BLOOD PRESSURE: 67 MMHG

## 2024-12-18 DIAGNOSIS — Z90.49 S/P LAPAROSCOPIC CHOLECYSTECTOMY: Primary | ICD-10-CM

## 2024-12-18 DIAGNOSIS — D36.9 ADENOMATOUS POLYP: ICD-10-CM

## 2024-12-18 DIAGNOSIS — R10.13 EPIGASTRIC PAIN: ICD-10-CM

## 2024-12-18 DIAGNOSIS — Z83.79 FAMILY HISTORY OF CELIAC DISEASE: ICD-10-CM

## 2024-12-18 DIAGNOSIS — K29.70 GASTRITIS WITHOUT BLEEDING, UNSPECIFIED CHRONICITY, UNSPECIFIED GASTRITIS TYPE: ICD-10-CM

## 2024-12-18 PROCEDURE — 99214 OFFICE O/P EST MOD 30 MIN: CPT | Performed by: NURSE PRACTITIONER

## 2024-12-18 NOTE — ASSESSMENT & PLAN NOTE
In daughter, no evidence of celiac disease on recent EGD with duodenal biopsy  Orders:    omeprazole (PriLOSEC) 20 mg delayed release capsule; Take 1 capsule (20 mg total) by mouth daily

## 2024-12-18 NOTE — PROGRESS NOTES
Name: Rocky John      : 1970      MRN: [id number]  Encounter Provider: ANABELLE Michel  Encounter Date: 2024   Encounter department: [facility] [address]  :  Assessment & Plan  Epigastric pain  Pt with intermittent epigastric burning when he consumes things like coffee, mint, xylitol in gum, milk.   EGD 24: normal esophagus, mild erythematous mucosa in the body of the stomach and antrum, normal duodenum. Biopsies of the antrum and body of the stomach showed mild chronic inactive gastritis, no intestinal metaplasia, or h.pylori identified. Duodenal bx benign.  He reports symptoms have improved with Omeprazole but have not resolved and he was hoping to be able to eat what he wanted to again, also doesn't want to be on PPI longterm.    -Try to reduce stress, reports working a very stressful job  -Avoid dietary triggers, NSAIDs  -Anti-reflux diet handout given  -Can try weaning off Omeprazole by taking every other day for 1-2 weeks and then stopping and just taking Pepcid PRN or daily  -Could also consider a short course of Carafate if symptoms flare in the future  -F/u with Dr. Perez 6-8 months, consider trial of a tricyclic antidepressant pending course    Orders:    omeprazole (PriLOSEC) 20 mg delayed release capsule; Take 1 capsule (20 mg total) by mouth daily    Gastritis without bleeding, unspecified chronicity, unspecified gastritis type  See above  Orders:    omeprazole (PriLOSEC) 20 mg delayed release capsule; Take 1 capsule (20 mg total) by mouth daily    S/P laparoscopic cholecystectomy  2022 for biliary dyskinesia/chronic cholecystitis seen on HIDA scan with EF 0%, biopsy showed chronic cholecystitis with Rokitansky-Aschoff sinuses  Reports symptoms improved following this, but then reoccurred as above       Adenomatous polyp  1 cecal polyp removed 21 with bx c/w tubular adenoma  Due for repeat colonoscopy 2026    "    Family history of celiac disease  In daughter, no evidence of celiac disease on recent EGD with duodenal biopsy  Orders:    omeprazole (PriLOSEC) 20 mg delayed release capsule; Take 1 capsule (20 mg total) by mouth daily        History of Present Illness   HPI  Rocky John is a 54 y.o. male who presents for follow up to EGD for epigastric burning.   He underwent workup for epigastric pain in 2021 including EGD, CTAP, US, HIDA scan which was notable for biliary dyskinesia/chronic cholecystitis and he underwent cholecystectomy by Dr. Doyle in March 2022. He reports symptoms improved for a while and then came back.     Prior EGD in 2021 notable for significant gastritis  He has a history of abdominal hernia repair with mesh and it was felt he may have adhesive disease contributing to symptoms as well.     History obtained from: patient    Review of Systems  Medical History Reviewed by provider this encounter:  Tobacco  Allergies  Meds  Problems  Med Hx  Surg Hx  Fam Hx     .     Objective   /67 (BP Location: Left arm, Patient Position: Sitting, Cuff Size: Standard)   Pulse 76   Ht 5' 7\" (1.702 m)   Wt 81.2 kg (179 lb)   BMI 28.04 kg/m²      Physical Exam  Vitals and nursing note reviewed.   Constitutional:       General: He is not in acute distress.     Appearance: He is well-developed.   HENT:      Head: Normocephalic and atraumatic.   Eyes:      Conjunctiva/sclera: Conjunctivae normal.   Cardiovascular:      Rate and Rhythm: Normal rate and regular rhythm.      Heart sounds: No murmur heard.  Pulmonary:      Effort: Pulmonary effort is normal. No respiratory distress.      Breath sounds: Normal breath sounds.   Abdominal:      Palpations: Abdomen is soft.      Tenderness: There is no abdominal tenderness.   Musculoskeletal:         General: No swelling.      Cervical back: Neck supple.   Skin:     General: Skin is warm and dry.      Capillary Refill: Capillary refill takes less than 2 " seconds.   Neurological:      Mental Status: He is alert and oriented to person, place, and time.   Psychiatric:         Mood and Affect: Mood normal.

## 2024-12-18 NOTE — ASSESSMENT & PLAN NOTE
March 2022 for biliary dyskinesia/chronic cholecystitis seen on HIDA scan with EF 0%, biopsy showed chronic cholecystitis with Rokitansky-Aschoff sinuses  Reports symptoms improved following this, but then reoccurred as above

## 2025-01-02 ENCOUNTER — TELEPHONE (OUTPATIENT)
Age: 55
End: 2025-01-02

## 2025-01-02 DIAGNOSIS — Z12.5 PROSTATE CANCER SCREENING: ICD-10-CM

## 2025-01-02 DIAGNOSIS — Z13.220 SCREENING FOR LIPID DISORDERS: ICD-10-CM

## 2025-01-02 DIAGNOSIS — Z13.1 SCREENING FOR DIABETES MELLITUS (DM): Primary | ICD-10-CM

## 2025-01-02 NOTE — TELEPHONE ENCOUNTER
Pt requested a copy of routine lab orders that have to be done before next appt. Please include PSA. Please contact pt when it is ready. Thank you.

## 2025-01-10 ENCOUNTER — APPOINTMENT (OUTPATIENT)
Dept: LAB | Facility: CLINIC | Age: 55
End: 2025-01-10
Payer: COMMERCIAL

## 2025-01-10 ENCOUNTER — RA CDI HCC (OUTPATIENT)
Dept: OTHER | Facility: HOSPITAL | Age: 55
End: 2025-01-10

## 2025-01-10 DIAGNOSIS — Z12.5 PROSTATE CANCER SCREENING: ICD-10-CM

## 2025-01-10 DIAGNOSIS — Z13.220 SCREENING FOR LIPID DISORDERS: ICD-10-CM

## 2025-01-10 DIAGNOSIS — Z13.1 SCREENING FOR DIABETES MELLITUS (DM): ICD-10-CM

## 2025-01-10 LAB
ALBUMIN SERPL BCG-MCNC: 4.7 G/DL (ref 3.5–5)
ALP SERPL-CCNC: 59 U/L (ref 34–104)
ALT SERPL W P-5'-P-CCNC: 16 U/L (ref 7–52)
ANION GAP SERPL CALCULATED.3IONS-SCNC: 7 MMOL/L (ref 4–13)
AST SERPL W P-5'-P-CCNC: 20 U/L (ref 13–39)
BILIRUB SERPL-MCNC: 0.7 MG/DL (ref 0.2–1)
BUN SERPL-MCNC: 24 MG/DL (ref 5–25)
CALCIUM SERPL-MCNC: 9.6 MG/DL (ref 8.4–10.2)
CHLORIDE SERPL-SCNC: 105 MMOL/L (ref 96–108)
CHOLEST SERPL-MCNC: 168 MG/DL (ref ?–200)
CO2 SERPL-SCNC: 31 MMOL/L (ref 21–32)
CREAT SERPL-MCNC: 1.03 MG/DL (ref 0.6–1.3)
GFR SERPL CREATININE-BSD FRML MDRD: 82 ML/MIN/1.73SQ M
GLUCOSE P FAST SERPL-MCNC: 88 MG/DL (ref 65–99)
HDLC SERPL-MCNC: 45 MG/DL
LDLC SERPL CALC-MCNC: 109 MG/DL (ref 0–100)
POTASSIUM SERPL-SCNC: 4.7 MMOL/L (ref 3.5–5.3)
PROT SERPL-MCNC: 7 G/DL (ref 6.4–8.4)
PSA SERPL-MCNC: 0.81 NG/ML (ref 0–4)
SODIUM SERPL-SCNC: 143 MMOL/L (ref 135–147)
TRIGL SERPL-MCNC: 69 MG/DL (ref ?–150)

## 2025-01-10 PROCEDURE — G0103 PSA SCREENING: HCPCS

## 2025-01-10 PROCEDURE — 80061 LIPID PANEL: CPT

## 2025-01-10 PROCEDURE — 80053 COMPREHEN METABOLIC PANEL: CPT

## 2025-01-10 PROCEDURE — 36415 COLL VENOUS BLD VENIPUNCTURE: CPT

## 2025-01-10 NOTE — PROGRESS NOTES
HCC coding opportunities       Chart reviewed, no opportunity found: CHART REVIEWED, NO OPPORTUNITY FOUND        Patients Insurance        Commercial Insurance: [employer]

## 2025-01-12 ENCOUNTER — RESULTS FOLLOW-UP (OUTPATIENT)
Dept: FAMILY MEDICINE CLINIC | Facility: CLINIC | Age: 55
End: 2025-01-12

## 2025-01-12 PROBLEM — K21.9 CHRONIC GERD: Status: ACTIVE | Noted: 2021-01-14

## 2025-01-17 ENCOUNTER — OFFICE VISIT (OUTPATIENT)
Dept: FAMILY MEDICINE CLINIC | Facility: CLINIC | Age: 55
End: 2025-01-17
Payer: COMMERCIAL

## 2025-01-17 VITALS
TEMPERATURE: 97.2 F | HEART RATE: 70 BPM | DIASTOLIC BLOOD PRESSURE: 68 MMHG | BODY MASS INDEX: 27.12 KG/M2 | SYSTOLIC BLOOD PRESSURE: 124 MMHG | WEIGHT: 172.8 LBS | RESPIRATION RATE: 18 BRPM | HEIGHT: 67 IN

## 2025-01-17 DIAGNOSIS — Z00.00 HEALTHCARE MAINTENANCE: Primary | ICD-10-CM

## 2025-01-17 DIAGNOSIS — R06.81 WITNESSED EPISODE OF APNEA: ICD-10-CM

## 2025-01-17 DIAGNOSIS — Z91.89 FRAMINGHAM CARDIAC RISK <10% IN NEXT 10 YEARS: ICD-10-CM

## 2025-01-17 DIAGNOSIS — J31.0 CHRONIC RHINITIS: ICD-10-CM

## 2025-01-17 DIAGNOSIS — H35.712 CENTRAL SEROUS RETINOPATHY, LEFT: ICD-10-CM

## 2025-01-17 DIAGNOSIS — Z86.0100 HISTORY OF COLONIC POLYPS: ICD-10-CM

## 2025-01-17 PROCEDURE — 99396 PREV VISIT EST AGE 40-64: CPT | Performed by: FAMILY MEDICINE

## 2025-01-17 RX ORDER — KETOCONAZOLE 20 MG/G
CREAM TOPICAL
COMMUNITY
Start: 2025-01-15

## 2025-01-17 NOTE — PROGRESS NOTES
"Name: Rocky John      : 1970      MRN: [id number]  Encounter Provider: Dreeje Ramon DO  Encounter Date: 2025   Encounter department: [facility]  :  Assessment & Plan  Witnessed episode of apnea  Noted by wife  Sleep study referral  Orders:    Ambulatory referral to Sleep Medicine; Future    Healthcare maintenance  yearly       History of colonic polyps  Tubular adenoma  Path report given to pt       Central serous retinopathy, left  Unchanged, avoids steroids       [place] cardiac risk <10% in next 10 years  3.9%       Chronic rhinitis  Unchanged  Avoids INS  Can try azelastine product otc vs atrovent NS              History of Present Illness     HPI  Tubular adenoma on cscope   Cscope repeat aware per protocol    Fell on ice 3w ago, hit head, fell backwards, no LOC  Slight dizziness improved  While running on ice  Neck/chest soreness  Did rest  Back to activity    Review of Systems   Constitutional:  Negative for chills and fever.   Neurological:  Negative for syncope.     Current Outpatient Medications   Medication Instructions    cholecalciferol (VITAMIN D3) 400 Units, Daily    ketoconazole (NIZORAL) 2 % cream     Multiple Vitamin (MULTI VITAMIN DAILY) TABS Take by mouth       Objective   /68   Pulse 70   Temp (!) 97.2 °F (36.2 °C)   Resp 18   Ht 5' 7\" (1.702 m)   Wt 78.4 kg (172 lb 12.8 oz)   BMI 27.06 kg/m²      Physical Exam  Vitals and nursing note reviewed.   Constitutional:       General: He is not in acute distress.     Appearance: He is well-developed. He is not ill-appearing.   HENT:      Head: Normocephalic.      Right Ear: Tympanic membrane and ear canal normal.      Left Ear: Tympanic membrane and ear canal normal.      Nose: No congestion.      Mouth/Throat:      Pharynx: No oropharyngeal exudate.   Eyes:      General: No scleral icterus.     Conjunctiva/sclera: Conjunctivae normal.   Neck:      Vascular: No carotid bruit.   Cardiovascular:      " Rate and Rhythm: Normal rate and regular rhythm.      Heart sounds: No murmur heard.  Pulmonary:      Effort: Pulmonary effort is normal. No respiratory distress.   Abdominal:      General: There is no distension.      Palpations: Abdomen is soft.      Tenderness: There is no abdominal tenderness.      Hernia: No hernia is present.   Genitourinary:     Penis: Normal.       Testes: Normal.      Prostate: Normal.      Rectum: Normal.   Musculoskeletal:         General: No deformity.      Cervical back: Neck supple.      Right lower leg: No edema.      Left lower leg: No edema.   Skin:     General: Skin is warm and dry.      Coloration: Skin is not jaundiced or pale.   Neurological:      Mental Status: He is alert.      Motor: No weakness.      Gait: Gait normal.   Psychiatric:         Mood and Affect: Mood normal.         Behavior: Behavior normal.         Thought Content: Thought content normal.     The 10-year ASCVD risk score (Sheron WILLINGHAM, et al., 2019) is: 3.9%    Values used to calculate the score:      Age: 54 years      Sex: Male      Is Non- : No      Diabetic: No      Tobacco smoker: No      Systolic Blood Pressure: 124 mmHg      Is BP treated: No      HDL Cholesterol: 45 mg/dL      Total Cholesterol: 168 mg/dL

## (undated) DEVICE — ANTI-FOG SOLUTION WITH FOAM PAD: Brand: DEVON

## (undated) DEVICE — SHEARS MONOPOL MINI 5MM X 31CM RATCHET HNDL

## (undated) DEVICE — PACK GENERAL LF

## (undated) DEVICE — SUT VICRYL PLUS 0 CT-3 27 IN VCP329H

## (undated) DEVICE — GLOVE INDICATOR PI UNDERGLOVE SZ 7.5 BLUE

## (undated) DEVICE — 3M™ TEGADERM™ TRANSPARENT FILM DRESSING FRAME STYLE, 1626W, 4 IN X 4-3/4 IN (10 CM X 12 CM), 50/CT 4CT/CASE: Brand: 3M™ TEGADERM™

## (undated) DEVICE — CHLORAPREP HI-LITE 26ML ORANGE

## (undated) DEVICE — ASTOUND STANDARD SURGICAL GOWN, XL: Brand: CONVERTORS

## (undated) DEVICE — GAUZE SPONGES,16 PLY: Brand: CURITY

## (undated) DEVICE — GLOVE SRG BIOGEL 7

## (undated) DEVICE — ENDO CLIP II 10MM PISTOL GRIP SINGLE USE CLIP APPLIER

## (undated) DEVICE — LATEX FREE 10 FT  INSUFFLATION TUBING, COLDER CONNECTOR WITH 1 MICRON FILTER STERILE ONE TIME USE, 20 U: Brand: SURGICAL DIRECT

## (undated) DEVICE — SMOKE EVACUATION TUBING WITH 8 IN INTEGRAL WAND AND SPONGE GUARD: Brand: BUFFALO FILTER

## (undated) DEVICE — IRRIG ENDO FLO TUBING

## (undated) DEVICE — INTENDED FOR TISSUE SEPARATION, AND OTHER PROCEDURES THAT REQUIRE A SHARP SURGICAL BLADE TO PUNCTURE OR CUT.: Brand: BARD-PARKER SAFETY BLADES SIZE 15, STERILE

## (undated) DEVICE — DRAPE UTILITY

## (undated) DEVICE — TISSUE RETRIEVAL SYSTEM: Brand: INZII RETRIEVAL SYSTEM

## (undated) DEVICE — TROCAR: Brand: KII FIOS FIRST ENTRY

## (undated) DEVICE — 3M™ STERI-STRIP™ REINFORCED ADHESIVE SKIN CLOSURES, R1547, 1/2 IN X 4 IN (12 MM X 100 MM), 6 STRIPS/ENVELOPE: Brand: 3M™ STERI-STRIP™

## (undated) DEVICE — TROCARS: Brand: KII® BALLOON BLUNT TIP SYSTEM

## (undated) DEVICE — TROCAR: Brand: KII SLEEVE

## (undated) DEVICE — SUT MONOCRYL 4-0 PC-5 18 IN Y823G

## (undated) DEVICE — BASIC DOUBLE BASIN 2-LF: Brand: MEDLINE INDUSTRIES, INC.

## (undated) DEVICE — 3M™ TEGADERM™ TRANSPARENT FILM DRESSING FRAME STYLE, 1624W, 2-3/8 IN X 2-3/4 IN (6 CM X 7 CM), 100/CT 4CT/CASE: Brand: 3M™ TEGADERM™

## (undated) DEVICE — DRAPE LAPAROTOMY W/POUCHES